# Patient Record
Sex: FEMALE | Race: WHITE | Employment: FULL TIME | URBAN - METROPOLITAN AREA
[De-identification: names, ages, dates, MRNs, and addresses within clinical notes are randomized per-mention and may not be internally consistent; named-entity substitution may affect disease eponyms.]

---

## 2016-11-09 LAB
HBSAG, EXTERNAL: NEGATIVE
HIV, EXTERNAL: NON REACTIVE
RUBELLA, EXTERNAL: NORMAL

## 2017-03-21 LAB
ANTIBODY SCREEN, EXTERNAL: NEGATIVE
T. PALLIDUM, EXTERNAL: NEGATIVE

## 2017-05-17 LAB — GRBS, EXTERNAL: NEGATIVE

## 2017-06-14 ENCOUNTER — ANESTHESIA (OUTPATIENT)
Dept: LABOR AND DELIVERY | Age: 27
End: 2017-06-14
Payer: COMMERCIAL

## 2017-06-14 ENCOUNTER — ANESTHESIA EVENT (OUTPATIENT)
Dept: LABOR AND DELIVERY | Age: 27
End: 2017-06-14
Payer: COMMERCIAL

## 2017-06-14 ENCOUNTER — HOSPITAL ENCOUNTER (INPATIENT)
Age: 27
LOS: 2 days | Discharge: HOME | End: 2017-06-16
Attending: OBSTETRICS & GYNECOLOGY | Admitting: OBSTETRICS & GYNECOLOGY
Payer: COMMERCIAL

## 2017-06-14 PROBLEM — Z37.9 NORMAL LABOR: Status: ACTIVE | Noted: 2017-06-14

## 2017-06-14 LAB
BASOPHILS # BLD AUTO: 0 K/UL (ref 0–0.1)
BASOPHILS # BLD: 0 % (ref 0–1)
EOSINOPHIL # BLD: 0.1 K/UL (ref 0–0.4)
EOSINOPHIL NFR BLD: 1 % (ref 0–7)
ERYTHROCYTE [DISTWIDTH] IN BLOOD BY AUTOMATED COUNT: 13.7 % (ref 11.5–14.5)
HCT VFR BLD AUTO: 42.3 % (ref 35–47)
HGB BLD-MCNC: 14.2 G/DL (ref 11.5–16)
LYMPHOCYTES # BLD AUTO: 13 % (ref 12–49)
LYMPHOCYTES # BLD: 2 K/UL (ref 0.8–3.5)
MCH RBC QN AUTO: 31.7 PG (ref 26–34)
MCHC RBC AUTO-ENTMCNC: 33.6 G/DL (ref 30–36.5)
MCV RBC AUTO: 94.4 FL (ref 80–99)
MONOCYTES # BLD: 0.8 K/UL (ref 0–1)
MONOCYTES NFR BLD AUTO: 5 % (ref 5–13)
NEUTS SEG # BLD: 12 K/UL (ref 1.8–8)
NEUTS SEG NFR BLD AUTO: 81 % (ref 32–75)
PLATELET # BLD AUTO: 128 K/UL (ref 150–400)
RBC # BLD AUTO: 4.48 M/UL (ref 3.8–5.2)
WBC # BLD AUTO: 14.9 K/UL (ref 3.6–11)

## 2017-06-14 PROCEDURE — 74011250636 HC RX REV CODE- 250/636: Performed by: OBSTETRICS & GYNECOLOGY

## 2017-06-14 PROCEDURE — 36415 COLL VENOUS BLD VENIPUNCTURE: CPT | Performed by: OBSTETRICS & GYNECOLOGY

## 2017-06-14 PROCEDURE — 0HQ9XZZ REPAIR PERINEUM SKIN, EXTERNAL APPROACH: ICD-10-PCS | Performed by: OBSTETRICS & GYNECOLOGY

## 2017-06-14 PROCEDURE — 77030031139 HC SUT VCRL2 J&J -A

## 2017-06-14 PROCEDURE — 00HU33Z INSERTION OF INFUSION DEVICE INTO SPINAL CANAL, PERCUTANEOUS APPROACH: ICD-10-PCS | Performed by: ANESTHESIOLOGY

## 2017-06-14 PROCEDURE — 65410000002 HC RM PRIVATE OB

## 2017-06-14 PROCEDURE — 3E0R3CZ INTRODUCE REGIONAL ANESTH IN SPINAL CANAL, PERC: ICD-10-PCS | Performed by: ANESTHESIOLOGY

## 2017-06-14 PROCEDURE — 75410000002 HC LABOR FEE PER 1 HR

## 2017-06-14 PROCEDURE — 75410000000 HC DELIVERY VAGINAL/SINGLE

## 2017-06-14 PROCEDURE — 76060000078 HC EPIDURAL ANESTHESIA

## 2017-06-14 PROCEDURE — 74011250636 HC RX REV CODE- 250/636: Performed by: ANESTHESIOLOGY

## 2017-06-14 PROCEDURE — 74011000250 HC RX REV CODE- 250

## 2017-06-14 PROCEDURE — 10907ZC DRAINAGE OF AMNIOTIC FLUID, THERAPEUTIC FROM PRODUCTS OF CONCEPTION, VIA NATURAL OR ARTIFICIAL OPENING: ICD-10-PCS | Performed by: OBSTETRICS & GYNECOLOGY

## 2017-06-14 PROCEDURE — 74011250637 HC RX REV CODE- 250/637: Performed by: OBSTETRICS & GYNECOLOGY

## 2017-06-14 PROCEDURE — 75410000003 HC RECOV DEL/VAG/CSECN EA 0.5 HR

## 2017-06-14 PROCEDURE — 4A0HXCZ MEASUREMENT OF PRODUCTS OF CONCEPTION, CARDIAC RATE, EXTERNAL APPROACH: ICD-10-PCS | Performed by: OBSTETRICS & GYNECOLOGY

## 2017-06-14 PROCEDURE — 85025 COMPLETE CBC W/AUTO DIFF WBC: CPT | Performed by: OBSTETRICS & GYNECOLOGY

## 2017-06-14 RX ORDER — SODIUM CHLORIDE 0.9 % (FLUSH) 0.9 %
5-10 SYRINGE (ML) INJECTION EVERY 8 HOURS
Status: DISCONTINUED | OUTPATIENT
Start: 2017-06-14 | End: 2017-06-16 | Stop reason: HOSPADM

## 2017-06-14 RX ORDER — BUPIVACAINE HYDROCHLORIDE 5 MG/ML
INJECTION, SOLUTION EPIDURAL; INTRACAUDAL AS NEEDED
Status: DISCONTINUED | OUTPATIENT
Start: 2017-06-14 | End: 2017-06-14 | Stop reason: HOSPADM

## 2017-06-14 RX ORDER — DOCUSATE SODIUM 100 MG/1
100 CAPSULE, LIQUID FILLED ORAL
Status: DISCONTINUED | OUTPATIENT
Start: 2017-06-14 | End: 2017-06-16 | Stop reason: HOSPADM

## 2017-06-14 RX ORDER — BUPIVACAINE HYDROCHLORIDE 5 MG/ML
30 INJECTION, SOLUTION EPIDURAL; INTRACAUDAL AS NEEDED
Status: DISCONTINUED | OUTPATIENT
Start: 2017-06-14 | End: 2017-06-14 | Stop reason: HOSPADM

## 2017-06-14 RX ORDER — IBUPROFEN 400 MG/1
800 TABLET ORAL EVERY 8 HOURS
Status: DISCONTINUED | OUTPATIENT
Start: 2017-06-14 | End: 2017-06-16 | Stop reason: HOSPADM

## 2017-06-14 RX ORDER — OXYTOCIN/RINGER'S LACTATE 20/1000 ML
125-1000 PLASTIC BAG, INJECTION (ML) INTRAVENOUS AS NEEDED
Status: DISCONTINUED | OUTPATIENT
Start: 2017-06-14 | End: 2017-06-16 | Stop reason: HOSPADM

## 2017-06-14 RX ORDER — TERBUTALINE SULFATE 1 MG/ML
0.25 INJECTION SUBCUTANEOUS AS NEEDED
Status: DISCONTINUED | OUTPATIENT
Start: 2017-06-14 | End: 2017-06-14 | Stop reason: HOSPADM

## 2017-06-14 RX ORDER — SODIUM CHLORIDE, SODIUM LACTATE, POTASSIUM CHLORIDE, CALCIUM CHLORIDE 600; 310; 30; 20 MG/100ML; MG/100ML; MG/100ML; MG/100ML
125 INJECTION, SOLUTION INTRAVENOUS CONTINUOUS
Status: DISCONTINUED | OUTPATIENT
Start: 2017-06-14 | End: 2017-06-16 | Stop reason: HOSPADM

## 2017-06-14 RX ORDER — OXYTOCIN IN 5 % DEXTROSE 30/500 ML
PLASTIC BAG, INJECTION (ML) INTRAVENOUS
Status: DISPENSED
Start: 2017-06-14 | End: 2017-06-15

## 2017-06-14 RX ORDER — SIMETHICONE 80 MG
80 TABLET,CHEWABLE ORAL
Status: DISCONTINUED | OUTPATIENT
Start: 2017-06-14 | End: 2017-06-16 | Stop reason: HOSPADM

## 2017-06-14 RX ORDER — DIPHENHYDRAMINE HYDROCHLORIDE 50 MG/ML
12.5 INJECTION, SOLUTION INTRAMUSCULAR; INTRAVENOUS
Status: DISCONTINUED | OUTPATIENT
Start: 2017-06-14 | End: 2017-06-16 | Stop reason: HOSPADM

## 2017-06-14 RX ORDER — SODIUM CHLORIDE 0.9 % (FLUSH) 0.9 %
5-10 SYRINGE (ML) INJECTION AS NEEDED
Status: DISCONTINUED | OUTPATIENT
Start: 2017-06-14 | End: 2017-06-16 | Stop reason: HOSPADM

## 2017-06-14 RX ORDER — AMMONIA 15 % (W/V)
1 AMPUL (EA) INHALATION AS NEEDED
Status: DISCONTINUED | OUTPATIENT
Start: 2017-06-14 | End: 2017-06-16 | Stop reason: HOSPADM

## 2017-06-14 RX ORDER — HYDROCORTISONE ACETATE PRAMOXINE HCL 2.5; 1 G/100G; G/100G
CREAM TOPICAL AS NEEDED
Status: DISCONTINUED | OUTPATIENT
Start: 2017-06-14 | End: 2017-06-16 | Stop reason: HOSPADM

## 2017-06-14 RX ORDER — FENTANYL CITRATE 50 UG/ML
100 INJECTION, SOLUTION INTRAMUSCULAR; INTRAVENOUS
Status: DISCONTINUED | OUTPATIENT
Start: 2017-06-14 | End: 2017-06-14 | Stop reason: HOSPADM

## 2017-06-14 RX ORDER — FENTANYL/BUPIVACAINE/NS/PF 2-1250MCG
1-16 PREFILLED PUMP RESERVOIR EPIDURAL CONTINUOUS
Status: DISCONTINUED | OUTPATIENT
Start: 2017-06-14 | End: 2017-06-16 | Stop reason: HOSPADM

## 2017-06-14 RX ORDER — ONDANSETRON 2 MG/ML
4 INJECTION INTRAMUSCULAR; INTRAVENOUS
Status: DISCONTINUED | OUTPATIENT
Start: 2017-06-14 | End: 2017-06-14 | Stop reason: HOSPADM

## 2017-06-14 RX ORDER — NALBUPHINE HYDROCHLORIDE 10 MG/ML
10 INJECTION, SOLUTION INTRAMUSCULAR; INTRAVENOUS; SUBCUTANEOUS
Status: DISCONTINUED | OUTPATIENT
Start: 2017-06-14 | End: 2017-06-14 | Stop reason: HOSPADM

## 2017-06-14 RX ORDER — FENTANYL CITRATE 50 UG/ML
INJECTION, SOLUTION INTRAMUSCULAR; INTRAVENOUS AS NEEDED
Status: DISCONTINUED | OUTPATIENT
Start: 2017-06-14 | End: 2017-06-14 | Stop reason: HOSPADM

## 2017-06-14 RX ORDER — ONDANSETRON 2 MG/ML
4 INJECTION INTRAMUSCULAR; INTRAVENOUS
Status: DISCONTINUED | OUTPATIENT
Start: 2017-06-14 | End: 2017-06-16 | Stop reason: HOSPADM

## 2017-06-14 RX ORDER — NALOXONE HYDROCHLORIDE 0.4 MG/ML
0.4 INJECTION, SOLUTION INTRAMUSCULAR; INTRAVENOUS; SUBCUTANEOUS AS NEEDED
Status: DISCONTINUED | OUTPATIENT
Start: 2017-06-14 | End: 2017-06-14 | Stop reason: HOSPADM

## 2017-06-14 RX ORDER — FENTANYL CITRATE 50 UG/ML
100 INJECTION, SOLUTION INTRAMUSCULAR; INTRAVENOUS ONCE
Status: DISCONTINUED | OUTPATIENT
Start: 2017-06-14 | End: 2017-06-14 | Stop reason: HOSPADM

## 2017-06-14 RX ORDER — OXYCODONE AND ACETAMINOPHEN 5; 325 MG/1; MG/1
1 TABLET ORAL
Status: DISCONTINUED | OUTPATIENT
Start: 2017-06-14 | End: 2017-06-16 | Stop reason: HOSPADM

## 2017-06-14 RX ORDER — HYDROCORTISONE 1 %
CREAM (GRAM) TOPICAL AS NEEDED
Status: DISCONTINUED | OUTPATIENT
Start: 2017-06-14 | End: 2017-06-16 | Stop reason: HOSPADM

## 2017-06-14 RX ADMIN — BUPIVACAINE HYDROCHLORIDE 6 ML: 5 INJECTION, SOLUTION EPIDURAL; INTRACAUDAL at 11:00

## 2017-06-14 RX ADMIN — IBUPROFEN 800 MG: 400 TABLET, FILM COATED ORAL at 17:50

## 2017-06-14 RX ADMIN — FENTANYL CITRATE 100 MCG: 50 INJECTION, SOLUTION INTRAMUSCULAR; INTRAVENOUS at 11:00

## 2017-06-14 RX ADMIN — SODIUM CHLORIDE, SODIUM LACTATE, POTASSIUM CHLORIDE, AND CALCIUM CHLORIDE 125 ML/HR: 600; 310; 30; 20 INJECTION, SOLUTION INTRAVENOUS at 11:09

## 2017-06-14 RX ADMIN — ONDANSETRON 4 MG: 2 INJECTION INTRAMUSCULAR; INTRAVENOUS at 15:22

## 2017-06-14 RX ADMIN — SODIUM CHLORIDE, SODIUM LACTATE, POTASSIUM CHLORIDE, AND CALCIUM CHLORIDE 1000 ML: 600; 310; 30; 20 INJECTION, SOLUTION INTRAVENOUS at 09:47

## 2017-06-14 RX ADMIN — FENTANYL 0.2 MG/100ML-BUPIV 0.125%-NACL 0.9% EPIDURAL INJ 10 ML/HR: 2/0.125 SOLUTION at 10:49

## 2017-06-14 NOTE — ROUTINE PROCESS
TRANSFER - IN REPORT:    Verbal report received from ISABELA Denton RN on Ale Liao  being received from L&Dfor routine progression of care      Report consisted of patients Situation, Background, Assessment and   Recommendations(SBAR). Information from the following report(s) SBAR was reviewed with the receiving nurse. Opportunity for questions and clarification was provided. Assessment completed upon patients arrival to unit and care assumed.

## 2017-06-14 NOTE — ANESTHESIA PROCEDURE NOTES
Epidural Block    Start time: 6/14/2017 10:50 AM  End time: 6/14/2017 11:03 AM  Performed by: Marylou Downing  Authorized by: Mell IZAGUIRRE     Pre-Procedure  Indication: labor epidural    Preanesthetic Checklist: risks and benefits discussed and timeout performed    Timeout Time: 10:50        Epidural:   Patient position:  Left lateral decubitus  Prep region:  Lumbar  Prep: Chlorhexidine    Location:  L2-3    Needle and Epidural Catheter:   Needle Type:  Tuohy  Needle Gauge:  17 G  Injection Technique:  Loss of resistance using air  Attempts:  1  Catheter Size:  19 G  Events: no blood with aspiration, no cerebrospinal fluid with aspiration, no paresthesia and negative aspiration test    Test Dose:  Bupivacaine 0.25% and negative    Assessment:   Catheter Secured:  Tegaderm and tape  Insertion:  Uncomplicated  Patient tolerance:  Patient tolerated the procedure well with no immediate complications

## 2017-06-14 NOTE — ANESTHESIA POSTPROCEDURE EVALUATION
Post-Anesthesia Evaluation and Assessment    Patient: Christine Layton MRN: [id number]  SSN: [id number]    YOB: 1990  Age: 33 y.o. Sex: female       Cardiovascular Function/Vital Signs  Visit Vitals    /55    Pulse (!) 104    Temp 36.6 °C (97.8 °F)    Resp 14    Ht 5' 6\" (1.676 m)    Breastfeeding No       Patient is status post epidural anesthesia for * No procedures listed *. Nausea/Vomiting: None    Postoperative hydration reviewed and adequate. Pain:  Pain Scale 1: Labor Algorithm/Pain Intensity (06/14/17 1300)  Pain Intensity 1: 0 (06/14/17 1000)   Managed    Neurological Status: At baseline    Mental Status and Level of Consciousness: Arousable    Pulmonary Status:       Adequate oxygenation and airway patent    Complications related to anesthesia: None    Post-anesthesia assessment completed.  No concerns    Signed By: Fide Grover MD     June 14, 2017

## 2017-06-14 NOTE — IP AVS SNAPSHOT
[facility] [facility] 
[phone] Patient: Jaky Parker MRN: [id number] MDK:[birth year]74 You are allergic to the following Allergen Reactions Sulfa (Sulfonamide Antibiotics) Hives Recent Documentation Height Breastfeeding? OB Status Smoking Status 1.676 m Unknown Recent pregnancy Never Smoker Unresulted Labs Order Current Status SAMPLE TO BLOOD BANK In process Emergency Contacts Name Discharge Info Relation Home Work Mobile Juan Manuel Kruse  Other Relative [6] [phone] About your hospitalization You were admitted on:  June 14, 2017 You last received care in the:  [address] [facility] You were discharged on:  June 16, 2017 Unit phone number:  [phone] Why you were hospitalized Your primary diagnosis was:  Not on File Your diagnoses also included:  Normal Labor Providers Seen During Your Hospitalizations Provider Role Specialty Primary office phone Chano Choi MD Attending Provider Obstetrics & Gynecology [phone] Your Primary Care Physician (PCP) Primary Care Physician Office Phone Office Fax [facility] [phone] Follow-up Information Follow up With Details Comments Contact Info [facility] PL[facility] Call As needed for breastfeeding questions or concerns. [address], [address] 
[phone] Chano Choi MD Schedule an appointment as soon as possible for a visit in 6 weeks  [address] [facility] [address] [facility] 13 
[phone] Current Discharge Medication List  
  
CONTINUE these medications which have NOT CHANGED Dose & Instructions Dispensing Information Comments Morning Noon Evening Bedtime PRENATAL PLUS (CALCIUM CARB) 27 mg iron- 1 mg Tab Generic drug:  prenatal vit-calcium-iron-fa Your last dose was: Your next dose is:    
   
   
 Dose:  1 Tab Take 1 Tab by mouth daily. Refills:  0 STOP taking these medications   
 ondansetron 4 mg disintegrating tablet Commonly known as:  ZOFRAN ODT Discharge Instructions POSTPARTUM DISCHARGE INSTRUCTIONS Name:  Juanita Gregg YOB: 1990 Admission Diagnosis:  MATERNITY Normal labor Discharge Diagnosis:   
Problem List as of 6/16/2017  Never Reviewed Codes Class Noted - Resolved Normal labor ICD-10-CM: O80, Z37.9 ICD-9-CM: 637  6/14/2017 - Present Vaginal hematoma ICD-10-CM: N89.8 ICD-9-CM: 623.6  11/13/2014 - Present RESOLVED: PROM (premature rupture of membranes) ICD-10-CM: O42.90 ICD-9-CM: 658.10  11/10/2014 - 11/13/2014 RESOLVED: Active labor ICD-10-CM: Varun Hoot ICD-9-CM: Varun Hoot  11/9/2014 - 11/13/2014 Attending Physician:  Malick Waldron MD 
 
Delivery Type:  Vaginal Childbirth: What To Expect At Home Your Recovery: Your body will slowly heal in the next few weeks. It is easy to get too tired and overwhelmed during the first weeks after your baby is born. Changes in your hormones can shift your mood without warning. You may find it hard to meet the extra demands on your energy and time. Take it easy on yourself. Follow-up care is a key part of your treatment and safety. Be sure to make and go to all appointments, and call your doctor if you are having problems. It's also a good idea to know your test results and keep a list of the medicines you take. How can you care for yourself at home? Vaginal bleeding and cramps · After delivery, you will have a bloody discharge from the vagina. This will turn pink within a week and then white or yellow after about 10 days. It may last for 2 to 4 weeks or longer, until the uterus has healed. Use pads instead of tampons until you stop bleeding. · Do not worry if you pass some blood clots, as long as they are smaller than a golf ball. If you have a tear or stitches in your vaginal area, change the pad at least every 4 hours to prevent soreness and infection. · You may have cramps for the first few days after childbirth. These are normal and occur as the uterus shrinks to normal size. Take an over-the-counter pain medicine, such as acetaminophen (Tylenol), ibuprofen (Advil, Motrin), or naproxen (Aleve), for cramps. Read and follow all instructions on the label. Do not take aspirin, because it can cause more bleeding. Do not take acetaminophen (Tylenol) and other acetaminophen containing medications (i.e. Percocet) at the same time. Breast fullness · Your breasts may overfill (engorge) in the first few days after delivery. To help milk flow and to relieve pain, warm your breasts in the shower or by using warm, moist towels before nursing. · If you are not nursing, do not put warmth on your breasts or touch your breasts. Wear a tight bra or sports bra and use ice until the fullness goes away. This usually takes 2 to 3 days. · Put ice or a cold pack on your breast after nursing to reduce swelling and pain. Put a thin cloth between the ice and your skin. Activity · Eat a balanced diet. Do not try to lose weight by cutting calories. Keep taking your prenatal vitamins, or take a multivitamin. · Get as much rest as you can. Try to take naps when your baby sleeps during the day. · Get some exercise every day. But do not do any heavy exercise until your doctor says it is okay. · Wait until you are healed (about 4 to 6 weeks) before you have sexual intercourse. Your doctor will tell you when it is okay to have sex. · Talk to your doctor about birth control. You can get pregnant even before your period returns. Also, you can get pregnant while you are breast-feeding. Mental Health · Many women get the \"baby blues\" during the first few days after childbirth. You may lose sleep, feel irritable, and cry easily. You may feel happy one minute and sad the next. Hormone changes are one cause of these emotional changes. Also, the demands of a new baby, along with visits from relatives or other family needs, add to a mother's stress. The \"baby blues\" often peak around the fourth day. Then they ease up in less than 2 weeks. · If your moodiness or anxiety lasts for more than 2 weeks, or if you feel like life is not worth living, you may have postpartum depression. This is different for each mother. Some mothers with serious depression may worry intensely about their infant's well-being. Others may feel distant from their child. Some mothers might even feel that they might harm their baby. A mother may have signs of paranoia, wondering if someone is watching her. · With all the changes in your life, you may not know if you are depressed. Pregnancy sometimes causes changes in how you feel that are similar to the symptoms of depression. · Symptoms of depression include: · Feeling sad or hopeless and losing interest in daily activities. These are the most common symptoms of depression. · Sleeping too much or not enough. · Feeling tired. You may feel as if you have no energy. · Eating too much or too little. · POSTPARTUM SUPPORT INTERNATIONAL (PSI) offers a Warm line; Chat with the Expert phone sessions; Information and Articles about Pregnancy and Postpartum Mood Disorders; Comprehensive List of Free Support Groups; Knowledgeable local coordinators who will offer support, information, and resources; Guide to Resources on Cup[employer]-L[employer]; Calendar of events in the  mood disorders community; Latest News and Research; and [facility] [address] for [employer]. Remember - You are not alone; You are not to blame; With help, you will be well. 5-190-803-PPD(3610). WWW. POSTPARTUM. NET · Writing or talking about death, such as writing suicide notes or talking about guns, knives, or pills. Keep the numbers for these national suicide hotlines: 1-868-628-TALK (0-431.912.8723) and 4-543-QTDPASW (8-600.260.5690). If you or someone you know talks about suicide or feeling hopeless, get help right away. Constipation and Hemorrhoids · Drink plenty of fluids, enough so that your urine is light yellow or clear like water. If you have kidney, heart, or liver disease and have to limit fluids, talk with your doctor before you increase the amount of fluids you drink. · Eat plenty of fiber each day. Have a bran muffin or bran cereal for breakfast, and try eating a piece of fruit for a mid-afternoon snack. · For painful, itchy hemorrhoids, put ice or a cold pack on the area several times a day for 10 minutes at a time. Follow this by putting a warm compress on the area for another 10 to 20 minutes or by sitting in a shallow, warm bath. When should you call for help? Call 911 anytime you think you may need emergency care. For example, call if: 
· You are thinking of hurting yourself, your baby, or anyone else. · You passed out (lost consciousness). · You have symptoms of a blood clot in your lung (called a pulmonary embolism). These may include:   
· Sudden chest pain. · Trouble breathing. · Coughing up blood. Call your doctor now or seek immediate medical care if: 
· You have severe vaginal bleeding. · You are soaking through a pad each hour for 2 or more hours. · Your vaginal bleeding seems to be getting heavier or is still bright red 4 days after delivery. · You are dizzy or lightheaded, or you feel like you may faint. · You are vomiting or cannot keep fluids down. · You have a fever. · You have new or more belly pain. · You pass tissue (not just blood). · Your vaginal discharge smells bad. · Your belly feels tender or full and hard. · Your breasts are continuously painful or red. · You feel sad, anxious, or hopeless for more than a few days. · You have sudden, severe pain in your belly. · You have symptoms of a blood clot in your leg (called a deep vein thrombosis),  
       such as: 
· Pain in your calf, back of the knee, thigh, or groin. · Redness and swelling in your leg or groin. · You have symptoms of preeclampsia, such as: 
· Sudden swelling of your face, hands, or feet. · New vision problems (such as dimness or blurring). · A severe headache. · Your blood pressure is higher than it should be or rises suddenly. · You have new nausea or vomiting. Watch closely for changes in your health, and be sure to contact your doctor if you have any problems. Additional Information:  Not applicable These are general instructions for a healthy lifestyle: No smoking/ No tobacco products/ Avoid exposure to second hand smoke Surgeon General's Warning:  Quitting smoking now greatly reduces serious risk to your health. Obesity, smoking, and sedentary lifestyle greatly increases your risk for illness A healthy diet, regular physical exercise & weight monitoring are important for maintaining a healthy lifestyle Recognize signs and symptoms of STROKE: 
 
F-face looks uneven A-arms unable to move or move unevenly S-speech slurred or non-existent T-time-call 911 as soon as signs and symptoms begin - DO NOT go  
    back to bed or wait to see if you get better - TIME IS BRAIN. I have had the opportunity to make my options or choices for discharge. I have received and understand these instructions. Discharge Orders None Introducing [facility] SERVICES! Ramesh Davey introduces [email or web address] patient portal. Now you can access parts of your medical record, email your doctor's office, and request medication refills online. 1. In your internet browser, go to [email or web address] 2. Click on the First Time User? Click Here link in the Sign In box. You will see the New Member Sign Up page. 3. Enter your [employer] Access Code exactly as it appears below. You will not need to use this code after youve completed the sign-up process. If you do not sign up before the expiration date, you must request a new code. · [employer] Access Code: [email or web address] Expires: 9/14/2017  9:48 AM 
 
4. Enter the last four digits of your Social Security Number (xxxx) and Date of Birth (mm/dd/yyyy) as indicated and click Submit. You will be taken to the next sign-up page. 5. Create a [employer] ID. This will be your [employer] login ID and cannot be changed, so think of one that is secure and easy to remember. 6. Create a [employer] password. You can change your password at any time. 7. Enter your Password Reset Question and Answer. This can be used at a later time if you forget your password. 8. Enter your e-mail address. You will receive e-mail notification when new information is available in [address] 9. Click Sign Up. You can now view and download portions of your medical record. 10. Click the Download Summary menu link to download a portable copy of your medical information. If you have questions, please visit the Frequently Asked Questions section of the [employer] website. Remember, [employer] is NOT to be used for urgent needs. For medical emergencies, dial 911. Now available from your iPhone and Android! General Information Please provide this summary of care documentation to your next provider. Patient Signature:  ____________________________________________________________ Date:  ____________________________________________________________  
  
Robb Roman Provider Signature:  ____________________________________________________________ Date:  ____________________________________________________________

## 2017-06-14 NOTE — PROGRESS NOTES
1145- Bedside and Verbal shift change report given to néstor paulson RN  (oncoming nurse) by nancy Galarza RN (offgoing nurse). Report included the following information SBAR, Procedure Summary, Intake/Output and MAR.     1730- infant latched on well and began nursing x1 side. 1745-TRANSFER - OUT REPORT:    Verbal report given to ALBA Heard RN (name) on [facility]  being transferred to [facility] (unit) for routine progression of care       Report consisted of patients Situation, Background, Assessment and   Recommendations(SBAR). Information from the following report(s) SBAR, Intake/Output, MAR, Accordion, Recent Results and Med Rec Status was reviewed with the receiving nurse. Lines:   Peripheral IV 03/15/16 Left Antecubital (Active)       Peripheral IV 06/14/17 Left Wrist (Active)   Site Assessment Clean, dry, & intact 6/14/2017  8:43 AM   Phlebitis Assessment 0 6/14/2017  8:43 AM   Infiltration Assessment 0 6/14/2017  8:43 AM   Dressing Status Clean, dry, & intact 6/14/2017  8:43 AM   Dressing Type Tape;Transparent 6/14/2017  8:43 AM   Hub Color/Line Status Pink 6/14/2017  8:43 AM   Action Taken Blood drawn 6/14/2017  8:43 AM        Opportunity for questions and clarification was provided.       Patient transported with:   Registered Nurse

## 2017-06-14 NOTE — IP AVS SNAPSHOT
Current Discharge Medication List  
  
CONTINUE these medications which have NOT CHANGED Dose & Instructions Dispensing Information Comments Morning Noon Evening Bedtime PRENATAL PLUS (CALCIUM CARB) 27 mg iron- 1 mg Tab Generic drug:  prenatal vit-calcium-iron-fa Your last dose was: Your next dose is:    
   
   
 Dose:  1 Tab Take 1 Tab by mouth daily. Refills:  0 STOP taking these medications   
 ondansetron 4 mg disintegrating tablet Commonly known as:  ZOFRAN ODT

## 2017-06-14 NOTE — PROGRESS NOTES
Labor Progress Note  Patient seen, fetal heart rate and contraction pattern evaluated. Pt comfortable with epidural    VSS, pitocin @ NA  Fetal Heart Rate: moderate variability  Accelerations: yes  Decelerations: none  Uterine Activity: not registering well  Cervical Exam: 8/90/-2  Membranes:  AROM - clear    Assessment/Plan:  Reassuring fetal status. Will continue current management.   AROM - clear

## 2017-06-14 NOTE — L&D DELIVERY NOTE
Delivery Summary  Patient: Sterling Desai             Circumcision:   NA-female  Additional Delivery Comments - head OA, rest of body followed quickly and without difficulty. 1st degree vaginal/perineal lac repaired for reapproximation of tissue (perineal skin pulled away from vaginal skin). Information for the patient's :  Maris Caraballo [[id number]]       Labor Events:    Labor: No   Rupture Date: 2017   Rupture Time: 11:41 AM   Rupture Type AROM   Amniotic Fluid Volume: Moderate    Amniotic Fluid Description: Clear     Induction: None       Augmentation: AROM   Labor Events: None     Cervical Ripening:     None     Delivery Events:  Episiotomy: None   Laceration(s): First degree perineal     Repaired:      Number of Repair Packets:     Suture Type and Size: Chromic 3-0     Estimated Blood Loss (ml):  ml       Delivery Date: 2017    Delivery Time: 2:40 PM  Delivery Type: Vaginal, Spontaneous Delivery  Sex:  Female     Gestational Age: 36w2d   Delivery Clinician:  Matthew Smith  Living Status: Yes   Delivery Location: L&D            APGARS  One minute Five minutes Ten minutes   Skin color: 1   1        Heart rate: 2   2        Grimace: 2   2        Muscle tone: 2   2        Breathin   2        Totals: 9   9            Presentation: Vertex    Position:        Resuscitation Method:  Suctioning-bulb; Tactile Stimulation     Meconium Stained: None      Cord Vessels: 3 Vessels      Cord Events:    Cord Blood Sent?:  Yes    Blood Gases Sent?:  No    Placenta:  Date/Time:   2:49 PM  Removal: Spontaneous      Appearance: Normal      Measurements:  Birth Weight:        Birth Length:        Head Circumference:        Chest Circumference:       Abdominal Girth:       Other Providers:   SYL CLYATON, Obstetrician;Primary Nurse;Primary [place] Nurse           Cord pH:  none    Episiotomy: None   Laceration(s): First degree perineal Estimated Blood Loss (ml): 300    Labor Events  Method: None       Augmentation: AROM   Cervical Ripening:       None        Operative Vaginal Delivery - none    Group B Strep:   Lab Results   Component Value Date/Time    GrBStrejohn, External negative 2017     Information for the patient's :  Martin Saenz [[id number]]     Lab Results   Component Value Date/Time    ABO/Rh(D) O POSITIVE 2017 02:48 PM    LAURA IgG NEG 2017 02:48 PM    Bilirubin if LAURA pos: IF DIRECT CHERRI POSITIVE, BILIRUBIN TO FOLLOW 2017 02:48 PM     No results found for: APH, APCO2, APO2, AHCO3, ABEC, ABDC, O2ST, EPHV, PCO2V, PO2V, HCO3V, EBEV, EBDV, SITE, RSCOM

## 2017-06-14 NOTE — H&P
EDC:06/10/2017  EGA: 40 weeks, 4 days      Primary Provider:  Dexter Burkitt MD      History of Present Illness:  pt presents to L&D with RUC that are more painful since 4:30 am    cvx in office was 3-4, now 4cm per RN. will keep for labor. Patient's Prenatal Care with Doctor of Record Dexter Burkitt MD Notable For -    Normal pregnancy multigravida   lab screening  vaginal hematoma postpartum after G1, 10cm, blood transfusion-labs__, MFM consult__          Impression & Recommendations:    Problem # 1:  Normal pregnancy multigravida (ICD-V22.1) (RLU16-U54.83)  term labor  GBS negative  expectant managment  epidural when desired      Past Pregnancy History      :  2     Term Births:  1     Premature Births: 0     Living Children: 1     Para:   1     Mult. Births:  0     Prev : 0     Aborta:  0     Elect. Ab:  0     Spont. Ab:  0     Ectopics:  0    Pregnancy # 1     Delivery date:   11/10/2014     Weeks Gestation: 39w 4d     Delivery type:        Hours of labor:   10.5     Anesthesia type:   epidural     Delivery location:   [facility]     Infant Sex:  Female     Birth weight:  8lbs 7ozs     Name:  Edmund Side     Comments:  [facility] - Apgars 9/9, thin meconium, nuchal cord, 2nd degree laceration. complicated by large vaginal hematoma        Past Medical History:     Reviewed history from 2014 and no changes required:        IC         Headaches (Migraines)    Past Surgical History:     Reviewed history from 2014 and no changes required:         Tonsillectomy        [place] [facility]        Vaginal Delivery 11/10/2014 Female [facility])- complicated by 44IT vaginal hematoma, 2U pRBC, hematoma expectantly managed)    Family History Summary:      Reviewed history Last on 2017 and no changes required:2017      General Comments - FH:  Family history transferred to [address] [place] [address]      Social History:     Reviewed history from 2016 and no changes required:         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 Review of Systems        See HPI    Except as noted in the HPI, the review of systems is negative for General, Breast, , Resp, GI, Endo, MS, Psych and Heme. Allergies    SULFA (Moderate)      Medications Removed from Medication List        Flowsheet View for Follow-up Visit     Estimated weeks of        gestation:  40 4/7     Weight:  189.8     Blood pressure: 114 / 80      Vital Signs    Blood Pressure: 114 / 80    Height:   66.5 inches  Weight:  189.8 pounds      Physical Exam     General           General appearance:  no acute distress    Head           Inspection:   normal    Chest           Lungs:  clear to auscultation    Psych           Orientation:  oriented to time, place, and person          Mood:  no appearance of anxiety, depression, or agitation    Abdomen           Abdomen:  gravid            Impression & Recommendations:    Problem # 1:  Normal pregnancy multigravida (ICD-V22.1) (MPD97-Z23.83)  term labor  GBS negative  expectant managment  epidural when desired      Medications (at conclusion of this visit)    11/09/2016 * PNV           LABORATORY DATA   TEST DATE RESULT   Group B Strep culture 05/17/2017 Negative                                   (Group B Strep Culture Result Field)   Blood Type 11/09/2016 O                                             (Blood Type Result Field)   Rh 11/09/2016 Positive                                   (Rh Result Field)   Rhogam Inj Given 11/11/2014 *   Tdap Vaccine Given 03/21/2017 Vacc.  [address]   Antibody Screen 03/21/2017 Negative   Rubella  [facility] Reference Ranges On or After 3/10/14                  <0.90              Non-immune      0.90 - 0.99     Equivocal      >0.99              Immune    [facility] Reference Ranges  Before 3/10/14           <5                 Non-immune 5 - 9               Equivocal            >9                 Immune  [facility] Reference Ranges       < Or = 0.90       Negative             0.91-1.09          Equivocal            > Or = 1.10       Positive   11/09/2016     2.17     TPA (T Pallidum Antibodies) 03/21/2017 Negative   Serology (RPR) 11/11/2014 *   HBsAg 11/09/2016 Negative   HIV 11/09/2016 Non Reactive   Hemoglobin 03/21/2017 13.4   Hematocrit 03/21/2017 39.6   Platelets 37/50/6579 164 X10E3/UL   TSH 11/11/2014 *   Urine Culture 11/09/2016 Negative   GC DNA Probe 11/09/2016 Negative   Chlamydia DNA 11/09/2016 Negative   PAP 03/31/2016 NIL   Flu Vaccine Given 11/09/2016 vacc. elsewhere   HGBA1C 11/11/2014 *   HGB Electro 04/02/2014 N/A   T4, Free 11/11/2014 *   BG Fasting 11/11/2014 *   GTT 1H 50G 03/21/2017 102   GTT 1H 100G 11/11/2014 *   GTT 2H 100G 11/11/2014 *   GTT 3H 100G 11/11/2014 *   Glucose Plasma 11/11/2014 *   CF Accept or Decline 11/09/2016 declined   CF Screen Result 04/02/2014 Declined   Nuchal Trans 01/24/2017 4.08^4. 08 mm&millimeters   AFP Only     Tetra 12/27/2016 *Screen Negative*   AFP Serum 11/11/2014 *   CVS 11/11/2014 *   AFP Amniotic 11/11/2014 *   Amnio Karyo 11/11/2014 *   FISH 11/11/2014 *   GC Culture 11/11/2014 *   Chlamydia Cult 11/11/2014 *   Ureaplasma     Mycoplasma     WBC 01/24/2017 11.2 X10E3/UL   RBC 01/24/2017 4.20 X10E6/UL   MCV 01/24/2017 93   MCH 01/24/2017 30.7   MCHC RBC 01/24/2017 33.0     ULTRASOUND DATA   TEST DATE RESULT   Estimated Fetal Weight 01/24/2017 395^395 g&grams                                     Weight % 01/24/2017 77^77% %&percent                                                JACINTO                      BPP     Cervical Length (mm)             Electronically signed by Marco Parr MD on 06/14/2017 at 9:34 AM    ________________________________________________________________________

## 2017-06-15 PROCEDURE — 74011250637 HC RX REV CODE- 250/637: Performed by: OBSTETRICS & GYNECOLOGY

## 2017-06-15 PROCEDURE — 65410000002 HC RM PRIVATE OB

## 2017-06-15 RX ADMIN — OXYCODONE HYDROCHLORIDE AND ACETAMINOPHEN 1 TABLET: 5; 325 TABLET ORAL at 04:46

## 2017-06-15 RX ADMIN — IBUPROFEN 800 MG: 400 TABLET, FILM COATED ORAL at 02:28

## 2017-06-15 RX ADMIN — IBUPROFEN 800 MG: 400 TABLET, FILM COATED ORAL at 20:32

## 2017-06-15 RX ADMIN — IBUPROFEN 800 MG: 400 TABLET, FILM COATED ORAL at 11:24

## 2017-06-15 NOTE — ROUTINE PROCESS
OB SBAR received from ALBA Ellis, [address] [facility]: patient OOB with assistance to bathroom. Voided large amount clear yellow urine. DTV complete. Check void due by 0155. Instructed to call out for assistance once more. 2145: patient OOB to bathroom. Voided large amount urine. Check void complete.     2000-0000: hourly rounds complete

## 2017-06-15 NOTE — PROGRESS NOTES
Post-Partum Day Number 1 Progress Note    Katie Kruse     Assessment: Doing well, post partum day 1, BF well. Plan:  1. Continue routine postpartum and perineal care as well as maternal education. 2. N/A     Information for the patient's :  Maris Caraballo [[id number]]   Vaginal, Spontaneous Delivery   Patient doing well without significant complaint. Voiding without difficulty, normal lochia. Vitals:  Visit Vitals    /58 (BP 1 Location: Left arm, BP Patient Position: At rest)    Pulse 76    Temp 98.1 °F (36.7 °C)    Resp 16    Ht 5' 6\" (1.676 m)    Breastfeeding Unknown     Temp (24hrs), Av.2 °F (36.8 °C), Min:97.8 °F (36.6 °C), Max:98.7 °F (37.1 °C)        Exam:   Patient without distress. Abdomen soft, fundus firm, nontender                Perineum with normal lochia noted. Lower extremities are negative for swelling, cords or tenderness.     Labs:     Lab Results   Component Value Date/Time    WBC 14.9 2017 09:01 AM    WBC 6.0 2015 01:45 AM    WBC 19.2 2014 07:00 PM    WBC 18.8 2014 07:04 AM    WBC 20.0 2014 02:03 AM    WBC 20.3 11/10/2014 10:12 PM    WBC 23.5 11/10/2014 08:06 PM    WBC 22.0 11/10/2014 06:08 PM    WBC 11.6 11/10/2014 04:15 AM    HGB 14.2 2017 09:01 AM    HGB 14.0 2015 01:45 AM    HGB 9.5 2014 07:00 PM    HGB 7.3 2014 07:04 AM    HGB 8.0 2014 02:03 AM    HGB 8.4 11/10/2014 10:12 PM    HGB 9.3 11/10/2014 08:06 PM    HGB 9.9 11/10/2014 06:08 PM    HGB 13.2 11/10/2014 04:15 AM    HCT 42.3 2017 09:01 AM    HCT 43.0 2015 01:45 AM    HCT 28.5 2014 07:00 PM    HCT 22.0 2014 07:04 AM    HCT 23.5 2014 02:03 AM    HCT 25.3 11/10/2014 10:12 PM    HCT 27.9 11/10/2014 08:06 PM    HCT 29.5 11/10/2014 06:08 PM    HCT 39.4 11/10/2014 04:15 AM    PLATELET 028  09:01 AM    PLATELET 586  01:45 AM    PLATELET 693  07:00 PM PLATELET 043 12/12/2449 07:04 AM    PLATELET 066 43/16/5960 02:03 AM    PLATELET 119 17/66/8243 10:12 PM    PLATELET 214 85/98/9717 08:06 PM    PLATELET 404 09/51/6948 06:08 PM    PLATELET 959 87/87/1614 04:15 AM       Recent Results (from the past 24 hour(s))   CBC WITH AUTOMATED DIFF    Collection Time: 06/14/17  9:01 AM   Result Value Ref Range    WBC 14.9 (H) 3.6 - 11.0 K/uL    RBC 4.48 3.80 - 5.20 M/uL    HGB 14.2 11.5 - 16.0 g/dL    HCT 42.3 35.0 - 47.0 %    MCV 94.4 80.0 - 99.0 FL    MCH 31.7 26.0 - 34.0 PG    MCHC 33.6 30.0 - 36.5 g/dL    RDW 13.7 11.5 - 14.5 %    PLATELET 689 (L) 439 - 400 K/uL    NEUTROPHILS 81 (H) 32 - 75 %    LYMPHOCYTES 13 12 - 49 %    MONOCYTES 5 5 - 13 %    EOSINOPHILS 1 0 - 7 %    BASOPHILS 0 0 - 1 %    ABS. NEUTROPHILS 12.0 (H) 1.8 - 8.0 K/UL    ABS. LYMPHOCYTES 2.0 0.8 - 3.5 K/UL    ABS. MONOCYTES 0.8 0.0 - 1.0 K/UL    ABS. EOSINOPHILS 0.1 0.0 - 0.4 K/UL    ABS.  BASOPHILS 0.0 0.0 - 0.1 K/UL

## 2017-06-15 NOTE — ROUTINE PROCESS
0730: OB SBAR report received by Thea Driscoll RN. 1200: Hourly rounds completed [id number].  1600: Hourly rounds completed [id number].  2000: Hourly rounds completed [id number].

## 2017-06-15 NOTE — LACTATION NOTE
This note was copied from a baby's chart. Initial Lactation Consultation - Baby born vaginally yesterday afternoon to a  mom at 43 weeks and 4 days. Mom states she nursed her first child for 16 months. She used a nipple shield for the first couple weeks with her first child because baby was having difficulty latching. Mom states this baby is latching. Her nipples are sore but she feels the latch is deep. Mom will call out at the next feeding so I can assess her latch. Feeding Plan: Mother will keep baby skin to skin as often as possible, feed on demand,  respond to feeding cues, obtain latch, listen for audible swallowing, be aware of signs of oxytocin release/ cramping,thrist,sleepyness while breastfeeding, offer both breasts,and will not limit feedings.

## 2017-06-16 VITALS
HEIGHT: 66 IN | TEMPERATURE: 98.4 F | DIASTOLIC BLOOD PRESSURE: 78 MMHG | HEART RATE: 84 BPM | RESPIRATION RATE: 16 BRPM | SYSTOLIC BLOOD PRESSURE: 119 MMHG

## 2017-06-16 PROCEDURE — 74011250637 HC RX REV CODE- 250/637: Performed by: OBSTETRICS & GYNECOLOGY

## 2017-06-16 RX ADMIN — IBUPROFEN 800 MG: 400 TABLET, FILM COATED ORAL at 06:06

## 2017-06-16 NOTE — LACTATION NOTE
This note was copied from a baby's chart. Mom and baby scheduled for discharge today. I did not see the baby at the breast. Mom states baby is nursing well and has improved throughout post partum stay, deep latch maintained, mother is comfortable, milk is in transition, baby feeding vigorously with rhythmic suck, swallow, breathe pattern, with audible swallowing, and evident milk transfer, both breasts offered, baby is asleep following feeding. Baby is feeding on demand, voiding and stools present as appropriate over the last 24 hours. We reviewed cluster feeding. The brief behavioral phase preceeding milk transition is called cluster feeding. Typical  behavior: baby becomes vigorous at the breast and wants to feed frequently- every 1-2 hours for several feedings. Emptying of the breast twice produces double in subsequent feedings. This is the normal process by which the baby demands his/her supply. This type of frequent feeding is the stimulation which causes lactogenesis II (milk coming in). Mom states baby did some cluster feeding late last evening. Discussed engorgement. Breasts may become engorged when milk \"comes in\". How milk is made / normal phases of milk production, supply and demand discussed. Taught care of engorged breasts - frequent breastfeeding encouraged, warm compresses and breast massage ac. Then nurse the baby or pump. Apply cold compresses pc x 15 minutes a few times a day for swelling or discomfort. May need to do this care for a couple of days. Mom states she feels her breast are getting russo and her milk is coming in. Teaching completed and all questions answered. Feeding log updated and given to mom.

## 2017-06-16 NOTE — PROGRESS NOTES
Post-Partum Day Number 2 Progress Note    Katie Kruse     Assessment: Doing well, post partum day 2, BF well    Plan:   1. Discharge home today  2. Follow up in office in 6 weeks with Radha Gama MD  3. Post partum activity advised, diet as tolerated  4. Discharge Medications: medications prior to admission, declines pain Rxs    Information for the patient's :  uBcky Martines [[id number]]   Vaginal, Spontaneous Delivery   Patient doing well without significant complaint. Voiding without difficulty, normal lochia. Vitals:  Visit Vitals    /76 (BP 1 Location: Left arm)    Pulse 94    Temp 98.4 °F (36.9 °C)    Resp 14    Ht 5' 6\" (1.676 m)    Breastfeeding Unknown     Temp (24hrs), Av.3 °F (36.8 °C), Min:98.1 °F (36.7 °C), Max:98.6 °F (37 °C)      Exam:         Patient without distress. Abdomen soft, fundus firm, nontender                 Lower extremities are negative for swelling, cords or tenderness.     Labs:     Lab Results   Component Value Date/Time    WBC 14.9 2017 09:01 AM    WBC 6.0 2015 01:45 AM    WBC 19.2 2014 07:00 PM    WBC 18.8 2014 07:04 AM    WBC 20.0 2014 02:03 AM    WBC 20.3 11/10/2014 10:12 PM    WBC 23.5 11/10/2014 08:06 PM    WBC 22.0 11/10/2014 06:08 PM    WBC 11.6 11/10/2014 04:15 AM    HGB 14.2 2017 09:01 AM    HGB 14.0 2015 01:45 AM    HGB 9.5 2014 07:00 PM    HGB 7.3 2014 07:04 AM    HGB 8.0 2014 02:03 AM    HGB 8.4 11/10/2014 10:12 PM    HGB 9.3 11/10/2014 08:06 PM    HGB 9.9 11/10/2014 06:08 PM    HGB 13.2 11/10/2014 04:15 AM    HCT 42.3 2017 09:01 AM    HCT 43.0 2015 01:45 AM    HCT 28.5 2014 07:00 PM    HCT 22.0 2014 07:04 AM    HCT 23.5 2014 02:03 AM    HCT 25.3 11/10/2014 10:12 PM    HCT 27.9 11/10/2014 08:06 PM    HCT 29.5 11/10/2014 06:08 PM    HCT 39.4 11/10/2014 04:15 AM    PLATELET 040  09:01 AM    PLATELET 063  01:45 AM PLATELET 825 38/89/0709 07:00 PM    PLATELET 504 77/35/9413 07:04 AM    PLATELET 890 12/52/5218 02:03 AM    PLATELET 130 92/08/3168 10:12 PM    PLATELET 961 62/28/0390 08:06 PM    PLATELET 856 60/92/3807 06:08 PM    PLATELET 677 25/82/4312 04:15 AM       No results found for this or any previous visit (from the past 24 hour(s)).

## 2017-06-16 NOTE — DISCHARGE INSTRUCTIONS
POSTPARTUM DISCHARGE INSTRUCTIONS       Name:  Rodney Quijano  YOB: 1990  Admission Diagnosis:  MATERNITY  Normal labor     Discharge Diagnosis:    Problem List as of 6/16/2017  Never Reviewed          Codes Class Noted - Resolved    Normal labor ICD-10-CM: O80, Z37.9  ICD-9-CM: 572  6/14/2017 - Present        Vaginal hematoma ICD-10-CM: N89.8  ICD-9-CM: 623.6  11/13/2014 - Present        RESOLVED: PROM (premature rupture of membranes) ICD-10-CM: O42.90  ICD-9-CM: 658.10  11/10/2014 - 11/13/2014        RESOLVED: Active labor ICD-10-CM: TCN4882  ICD-9-CM: Ardesaul Flatter  11/9/2014 - 11/13/2014            Attending Physician:  Joeann Ahumada, MD    Delivery Type:  Vaginal Childbirth: What To Expect At Home    Your Recovery: Your body will slowly heal in the next few weeks. It is easy to get too tired and overwhelmed during the first weeks after your baby is born. Changes in your hormones can shift your mood without warning. You may find it hard to meet the extra demands on your energy and time. Take it easy on yourself. Follow-up care is a key part of your treatment and safety. Be sure to make and go to all appointments, and call your doctor if you are having problems. It's also a good idea to know your test results and keep a list of the medicines you take. How can you care for yourself at home? Vaginal bleeding and cramps  · After delivery, you will have a bloody discharge from the vagina. This will turn pink within a week and then white or yellow after about 10 days. It may last for 2 to 4 weeks or longer, until the uterus has healed. Use pads instead of tampons until you stop bleeding. · Do not worry if you pass some blood clots, as long as they are smaller than a golf ball. If you have a tear or stitches in your vaginal area, change the pad at least every 4 hours to prevent soreness and infection. · You may have cramps for the first few days after childbirth.  These are normal and occur as the uterus shrinks to normal size. Take an over-the-counter pain medicine, such as acetaminophen (Tylenol), ibuprofen (Advil, Motrin), or naproxen (Aleve), for cramps. Read and follow all instructions on the label. Do not take aspirin, because it can cause more bleeding. Do not take acetaminophen (Tylenol) and other acetaminophen containing medications (i.e. Percocet) at the same time. Breast fullness  · Your breasts may overfill (engorge) in the first few days after delivery. To help milk flow and to relieve pain, warm your breasts in the shower or by using warm, moist towels before nursing. · If you are not nursing, do not put warmth on your breasts or touch your breasts. Wear a tight bra or sports bra and use ice until the fullness goes away. This usually takes 2 to 3 days. · Put ice or a cold pack on your breast after nursing to reduce swelling and pain. Put a thin cloth between the ice and your skin. Activity  · Eat a balanced diet. Do not try to lose weight by cutting calories. Keep taking your prenatal vitamins, or take a multivitamin. · Get as much rest as you can. Try to take naps when your baby sleeps during the day. · Get some exercise every day. But do not do any heavy exercise until your doctor says it is okay. · Wait until you are healed (about 4 to 6 weeks) before you have sexual intercourse. Your doctor will tell you when it is okay to have sex. · Talk to your doctor about birth control. You can get pregnant even before your period returns. Also, you can get pregnant while you are breast-feeding. Mental Health  · Many women get the \"baby blues\" during the first few days after childbirth. You may lose sleep, feel irritable, and cry easily. You may feel happy one minute and sad the next. Hormone changes are one cause of these emotional changes. Also, the demands of a new baby, along with visits from relatives or other family needs, add to a mother's stress.  The \"baby blues\" often peak around the fourth day. Then they ease up in less than 2 weeks. · If your moodiness or anxiety lasts for more than 2 weeks, or if you feel like life is not worth living, you may have postpartum depression. This is different for each mother. Some mothers with serious depression may worry intensely about their infant's well-being. Others may feel distant from their child. Some mothers might even feel that they might harm their baby. A mother may have signs of paranoia, wondering if someone is watching her. · With all the changes in your life, you may not know if you are depressed. Pregnancy sometimes causes changes in how you feel that are similar to the symptoms of depression. · Symptoms of depression include:  · Feeling sad or hopeless and losing interest in daily activities. These are the most common symptoms of depression. · Sleeping too much or not enough. · Feeling tired. You may feel as if you have no energy. · Eating too much or too little. · POSTPARTUM [employer] (PSI) offers a Warm line; Chat with the Expert phone sessions; Information and Articles about Pregnancy and Postpartum Mood Disorders; Comprehensive List of Free Support Groups; Knowledgeable local coordinators who will offer support, information, and resources; Guide to Resources on Unype; Calendar of events in the  mood disorders community; Latest News and Research; and [employer] [address] for [employer]. Remember - You are not alone; You are not to blame; With help, you will be well. 0-300-417-PPD(4128). WWW. POSTPARTUM. NET   · Writing or talking about death, such as writing suicide notes or talking about guns, knives, or pills. Keep the numbers for these national suicide hotlines: 3-327-602-TALK (2-291.179.1450) and 7-223-IDKHNCM (1-615.423.7054). If you or someone you know talks about suicide or feeling hopeless, get help right away.     Constipation and Hemorrhoids  · Drink plenty of fluids, enough so that your urine is light yellow or clear like water. If you have kidney, heart, or liver disease and have to limit fluids, talk with your doctor before you increase the amount of fluids you drink. · Eat plenty of fiber each day. Have a bran muffin or bran cereal for breakfast, and try eating a piece of fruit for a mid-afternoon snack. · For painful, itchy hemorrhoids, put ice or a cold pack on the area several times a day for 10 minutes at a time. Follow this by putting a warm compress on the area for another 10 to 20 minutes or by sitting in a shallow, warm bath. When should you call for help? Call 911 anytime you think you may need emergency care. For example, call if:  · You are thinking of hurting yourself, your baby, or anyone else. · You passed out (lost consciousness). · You have symptoms of a blood clot in your lung (called a pulmonary embolism). These may include:    · Sudden chest pain. · Trouble breathing. · Coughing up blood. Call your doctor now or seek immediate medical care if:  · You have severe vaginal bleeding. · You are soaking through a pad each hour for 2 or more hours. · Your vaginal bleeding seems to be getting heavier or is still bright red 4 days after delivery. · You are dizzy or lightheaded, or you feel like you may faint. · You are vomiting or cannot keep fluids down. · You have a fever. · You have new or more belly pain. · You pass tissue (not just blood). · Your vaginal discharge smells bad. · Your belly feels tender or full and hard. · Your breasts are continuously painful or red. · You feel sad, anxious, or hopeless for more than a few days. · You have sudden, severe pain in your belly. · You have symptoms of a blood clot in your leg (called a deep vein thrombosis),          such as:  · Pain in your calf, back of the knee, thigh, or groin. · Redness and swelling in your leg or groin.   · You have symptoms of preeclampsia, such as:  · Sudden swelling of your face, hands, or feet. · New vision problems (such as dimness or blurring). · A severe headache. · Your blood pressure is higher than it should be or rises suddenly. · You have new nausea or vomiting. Watch closely for changes in your health, and be sure to contact your doctor if you have any problems. Additional Information:  Not applicable    These are general instructions for a healthy lifestyle:    No smoking/ No tobacco products/ Avoid exposure to second hand smoke    Surgeon General's Warning:  Quitting smoking now greatly reduces serious risk to your health. Obesity, smoking, and sedentary lifestyle greatly increases your risk for illness    A healthy diet, regular physical exercise & weight monitoring are important for maintaining a healthy lifestyle    Recognize signs and symptoms of STROKE:    F-face looks uneven    A-arms unable to move or move unevenly    S-speech slurred or non-existent    T-time-call 911 as soon as signs and symptoms begin - DO NOT go       back to bed or wait to see if you get better - TIME IS BRAIN. I have had the opportunity to make my options or choices for discharge. I have received and understand these instructions.

## 2017-06-16 NOTE — ROUTINE PROCESS
Verbal/bedside report received from ISABELA Franco RN using Hector Schwab.    [id number] Hourly rounds complete

## 2017-06-16 NOTE — ROUTINE PROCESS
Bedside and Verbal shift change report given to ISABELA Monaco (oncoming nurse) by Iline Sandhoff. KRISTINE Phoenix (offgoing nurse). Report included the following information SBAR.     2000-0000: Hourly rounds completed.

## 2017-06-16 NOTE — DISCHARGE SUMMARY
Obstetrical Discharge Summary     Name: Elijah Desai MRN: [id number]  SSN: [id number]    YOB: 1990  Age: 33 y.o. Sex: female      Admit Date: 2017    Discharge Date: 2017     Admitting Physician: Teodoro Irby MD     Attending Physician:  Tyrone Marsh MD     Admission Diagnoses: MATERNITY  Normal labor    Discharge Diagnoses:   Information for the patient's :  Omer Mcclendon [[id number]]   Delivery of a 3.76 kg female infant via Vaginal, Spontaneous Delivery on 2017 at 2:40 PM  by . Apgars were 9 and 9. Additional Diagnoses:   Hospital Problems  Never Reviewed          Codes Class Noted POA    Normal labor ICD-10-CM: [de-identified], Z37.9  ICD-9-CM: 581  2017 Unknown             Lab Results   Component Value Date/Time    Rubella, External immune 2016    GrBStrep, External negative 2017       Hospital Course: Normal hospital course following the delivery. Disposition at Discharge: Home or self care    Discharged Condition: Stable    Patient Instructions:   Current Discharge Medication List      CONTINUE these medications which have NOT CHANGED    Details   prenatal vit-calcium-iron-fa (PRENATAL PLUS, CALCIUM CARB,) 27 mg iron- 1 mg tab Take 1 Tab by mouth daily. STOP taking these medications       ondansetron (ZOFRAN ODT) 4 mg disintegrating tablet Comments:   Reason for Stopping:               Reference my discharge instructions.     Follow-up Appointments   Procedures    FOLLOW UP VISIT Appointment in: 10 Weeks With Dr. Karime Inman record     With Dr. Karime Inman record     Standing Status:   Standing     Number of Occurrences:   1     Order Specific Question:   Appointment in     Answer:   6 Weeks        Signed By:  Mayra Dueñas CNM     2017

## 2017-07-24 ENCOUNTER — HOSPITAL ENCOUNTER (OUTPATIENT)
Dept: FAMILY PLANNING/WOMEN'S HEALTH CLINIC | Age: 27
Discharge: HOME | End: 2017-07-24

## 2017-07-24 NOTE — PROGRESS NOTES
[address]  [address]  [address] [place] [address], [address] [facility] [address]  Dept: [phone]    LACTATION REPORT TO HEALTHCARE PROVIDER    Date: 2017    Dear Dena Amend: This is to inform you that I have seen    Baby name (First Name, Last Name): Ming Arvizu                                  Mother: Temo Gutierresestic    Reason for Visit: difficulty latching; Other (Comment) (questionable low milk supply)    Baby date of birth: 17     Baby's Gestational age: 37.1   Birth Weight (Born Outside of [facility]): 3.771 kg (8 lb 5 oz)     Discharge weight: 3.515 kg (7 lb 12 oz)     Date                  Weight        Recorded Weight Date 1: 17  Recorded Weight 1: 4.706 kg (10 lb 6 oz)        Recorded  Weight Date 2: 17  Recorded Weight 2: 4.978 kg (10 lb 15.6 oz) (naked)    Pre-feed Weight: 4.995 kg (11 lb 0.2 oz)  Post-feed Weight Left Breast: 5.106 kg (11 lb 4.1 oz)  Post-feed Weight Right Breast: 5.069 kg (11 lb 2.8 oz)     Total amount of milk transferred: Total Weight Gain: 3.9 oz    Total time of feedin minutes    Amount of milk pumped (PC):  (did  not pump)    Amount of milk/formula supplemented: 0    Breast Assessment:  Left Breast: Medium  Left Nipple: Everted; Intact  Right Breast: Medium  Right Nipple: Everted; Intact    Oral assessement:  (labial lip tie and tongue tie noted)    Enclosed please find a copy of the instructions given to the patient. Mother in with baby for concerns of clicking heard during feeding and milk leaking from her mouth; believes that her latch is poor and thinks it may be affecting her supply. Oral assessment reveals a tongue tie and labial lip tie and high palate. Discussed with mother as possible cause for infant not making good seal at breast during feeding. Referred to Dr. Lynnette Garza at [facility] for evaluation. Reviewed good positioning and latch techniques and observed mother using good techniques.  Clicking heard during letdown and noted milk spilling form baby's mouth during feeding. Mother has no discomfort during feeding, and nipple round on release. Discussed milk supply and encouraged pumping if needed due to fullness before feeding or following feeding if she feels that breast is still full. Mother to have ENT consult then [facility]. Please feel free to contact me at [facility] with any questions or concerns.     Thank you,    Zane Richter RN IBCLC

## 2017-07-24 NOTE — DISCHARGE INSTRUCTIONS
HELPING BABY LATCH CORRECTLY                                             1. Stimulate the rooting reflex by making a circular motion around the lips, in one direction. Give baby verbal and visual cues by saying Farshad Pear and demonstrating with your face approximately 10 inches away from babys face to elicit a wide, open mouth. 2. Stimulate let down by massaging or pumping breasts for 1-2 minutes prior to latching. 3. Allow baby to suck clean finger, placed nail side down. 4. Place nursing pillow or bed pillows next to you high enough to bring baby to the level of the nipple. 5. Position baby belly to belly with you, making sure that head, neck and torso are in alignment. 6. Support babys head during latch. Babys body weight should rest comfortably on nursing pillow. 7. Roll the nipple gently between the fingers to make it stand out. 8. Fingers should be placed at the edge of areola and the breast should be compressed so that it enters parallel to babys open mouth, with the nipple slightly pointed toward the roof of the babys mouth. (Think of compressing a large sandwich to fit into your mouth!)   9. Wait for WIDE mouth, and then bring the baby to the breast, getting as much of the areola into the mouth as possible. Gentle downward pressure on the lower jaw as baby is coming to breast, may be helpful in keeping mouth open wide initially. 10. Hold the breast compressed and the baby gently in place to allow the baby to feel the nipple and begin suckling.  (U-hold may be helpful)  11. If baby has not latched correctly, begin the process again. 12. If baby is latched correctly, lips should be flanged outward, nose and chin should lightly   touch the breast.  No clicking or lip smacking should be heard. 13. Swallowing should be observed intermittently during entire feeding. Baby should pause   for breathing.   14. It is normal to have some brief discomfort initially when the baby latches, but it should lisa as the feeding progresses, particularly once the milk lets down. 15. If baby tends to compress nipple during feedings, un-latch after first let-down, roll nipples into round shape and re-latch. INFORMATION ON TONGUE TIE:    Tongue-tie is an anatomical abnormality affecting the ability of the tongue to move in an appropriate way to facilitate proper suckling at the breast. This may affect either the ability to stick the tongue out or to lift the tongue upward or both. Tongue-tie can sometimes be seen when the baby raises the tongue and sometimes can on ly be felt by an experienced medical professional on exam.    Henretta Norse can contribute to nipple pain and damage, ineffective breast emptying, tiring easily at the breast, short sleep cycles, reflux and colic, lip blisters, and poor weight gain. In a majority of cases,  tongue tie is accompanied by lip tie as well which prevents the upper lip from flanging out properly. Almost all tongue-tie and lip-tie can be treated with a simple medical procedure to release the tie. This procedure should be performed by an experienced medical professional. [facility] refers clients suspected of tongue or lip tie to Dr. Mary Velazco at [address] [place] [address], and [facility] located at [address] [place] [address], [address] [place] [address] Av. [phone]    For additional information on tongue and lip tie and post procedure exercises go to [facility]. Schedule evaluation by ENT for tongue and lip tie and follow up at [facility] or ENT as indicated/needed. Call [facility] with any questions or concerns.

## 2018-02-19 LAB
HBSAG, EXTERNAL: NEGATIVE
HIV, EXTERNAL: NON REACTIVE
RUBELLA, EXTERNAL: NORMAL
TYPE, ABO & RH, EXTERNAL: NORMAL

## 2018-07-09 LAB
ANTIBODY SCREEN, EXTERNAL: NEGATIVE
T. PALLIDUM, EXTERNAL: NEGATIVE

## 2018-09-06 LAB — GRBS, EXTERNAL: NEGATIVE

## 2018-09-26 ENCOUNTER — HOSPITAL ENCOUNTER (INPATIENT)
Age: 28
LOS: 2 days | Discharge: HOME | End: 2018-09-28
Attending: OBSTETRICS & GYNECOLOGY | Admitting: OBSTETRICS & GYNECOLOGY
Payer: COMMERCIAL

## 2018-09-26 ENCOUNTER — ANESTHESIA (OUTPATIENT)
Dept: LABOR AND DELIVERY | Age: 28
End: 2018-09-26
Payer: COMMERCIAL

## 2018-09-26 ENCOUNTER — ANESTHESIA EVENT (OUTPATIENT)
Dept: LABOR AND DELIVERY | Age: 28
End: 2018-09-26
Payer: COMMERCIAL

## 2018-09-26 LAB
ERYTHROCYTE [DISTWIDTH] IN BLOOD BY AUTOMATED COUNT: 14.5 % (ref 11.5–14.5)
HCT VFR BLD AUTO: 38.3 % (ref 35–47)
HGB BLD-MCNC: 12.6 G/DL (ref 11.5–16)
MCH RBC QN AUTO: 30.7 PG (ref 26–34)
MCHC RBC AUTO-ENTMCNC: 32.9 G/DL (ref 30–36.5)
MCV RBC AUTO: 93.2 FL (ref 80–99)
NRBC # BLD: 0 K/UL (ref 0–0.01)
NRBC BLD-RTO: 0 PER 100 WBC
PLATELET # BLD AUTO: 90 K/UL (ref 150–400)
RBC # BLD AUTO: 4.11 M/UL (ref 3.8–5.2)
WBC # BLD AUTO: 9.1 K/UL (ref 3.6–11)

## 2018-09-26 PROCEDURE — 36415 COLL VENOUS BLD VENIPUNCTURE: CPT | Performed by: OBSTETRICS & GYNECOLOGY

## 2018-09-26 PROCEDURE — 77030031139 HC SUT VCRL2 J&J -A

## 2018-09-26 PROCEDURE — 74011250637 HC RX REV CODE- 250/637: Performed by: OBSTETRICS & GYNECOLOGY

## 2018-09-26 PROCEDURE — 65410000002 HC RM PRIVATE OB

## 2018-09-26 PROCEDURE — 74011250636 HC RX REV CODE- 250/636

## 2018-09-26 PROCEDURE — 3E033VJ INTRODUCTION OF OTHER HORMONE INTO PERIPHERAL VEIN, PERCUTANEOUS APPROACH: ICD-10-PCS | Performed by: OBSTETRICS & GYNECOLOGY

## 2018-09-26 PROCEDURE — 77030005537 HC CATH URETH BARD -A

## 2018-09-26 PROCEDURE — A4300 CATH IMPL VASC ACCESS PORTAL: HCPCS

## 2018-09-26 PROCEDURE — 0KQM0ZZ REPAIR PERINEUM MUSCLE, OPEN APPROACH: ICD-10-PCS | Performed by: OBSTETRICS & GYNECOLOGY

## 2018-09-26 PROCEDURE — 74011250636 HC RX REV CODE- 250/636: Performed by: ANESTHESIOLOGY

## 2018-09-26 PROCEDURE — 00HU33Z INSERTION OF INFUSION DEVICE INTO SPINAL CANAL, PERCUTANEOUS APPROACH: ICD-10-PCS | Performed by: ANESTHESIOLOGY

## 2018-09-26 PROCEDURE — 74011250636 HC RX REV CODE- 250/636: Performed by: OBSTETRICS & GYNECOLOGY

## 2018-09-26 PROCEDURE — 74011000250 HC RX REV CODE- 250

## 2018-09-26 PROCEDURE — 85027 COMPLETE CBC AUTOMATED: CPT | Performed by: OBSTETRICS & GYNECOLOGY

## 2018-09-26 RX ORDER — ACETAMINOPHEN 325 MG/1
650 TABLET ORAL
Status: DISCONTINUED | OUTPATIENT
Start: 2018-09-26 | End: 2018-09-28 | Stop reason: HOSPADM

## 2018-09-26 RX ORDER — SIMETHICONE 80 MG
80 TABLET,CHEWABLE ORAL
Status: DISCONTINUED | OUTPATIENT
Start: 2018-09-26 | End: 2018-09-28 | Stop reason: HOSPADM

## 2018-09-26 RX ORDER — SODIUM CHLORIDE, SODIUM LACTATE, POTASSIUM CHLORIDE, CALCIUM CHLORIDE 600; 310; 30; 20 MG/100ML; MG/100ML; MG/100ML; MG/100ML
125 INJECTION, SOLUTION INTRAVENOUS CONTINUOUS
Status: DISCONTINUED | OUTPATIENT
Start: 2018-09-26 | End: 2018-09-26

## 2018-09-26 RX ORDER — FENTANYL CITRATE 50 UG/ML
100 INJECTION, SOLUTION INTRAMUSCULAR; INTRAVENOUS
Status: DISCONTINUED | OUTPATIENT
Start: 2018-09-26 | End: 2018-09-26

## 2018-09-26 RX ORDER — OXYTOCIN/0.9 % SODIUM CHLORIDE 30/500 ML
PLASTIC BAG, INJECTION (ML) INTRAVENOUS
Status: COMPLETED
Start: 2018-09-26 | End: 2018-09-26

## 2018-09-26 RX ORDER — FENTANYL CITRATE 50 UG/ML
INJECTION, SOLUTION INTRAMUSCULAR; INTRAVENOUS AS NEEDED
Status: DISCONTINUED | OUTPATIENT
Start: 2018-09-26 | End: 2018-09-26 | Stop reason: HOSPADM

## 2018-09-26 RX ORDER — DIPHENHYDRAMINE HCL 25 MG
25 CAPSULE ORAL
Status: DISCONTINUED | OUTPATIENT
Start: 2018-09-26 | End: 2018-09-28 | Stop reason: HOSPADM

## 2018-09-26 RX ORDER — AMMONIA 15 % (W/V)
1 AMPUL (EA) INHALATION AS NEEDED
Status: DISCONTINUED | OUTPATIENT
Start: 2018-09-26 | End: 2018-09-28 | Stop reason: HOSPADM

## 2018-09-26 RX ORDER — HYDROCORTISONE 1 %
CREAM (GRAM) TOPICAL AS NEEDED
Status: DISCONTINUED | OUTPATIENT
Start: 2018-09-26 | End: 2018-09-28 | Stop reason: HOSPADM

## 2018-09-26 RX ORDER — ONDANSETRON 4 MG/1
4 TABLET, ORALLY DISINTEGRATING ORAL
Status: DISCONTINUED | OUTPATIENT
Start: 2018-09-26 | End: 2018-09-28 | Stop reason: HOSPADM

## 2018-09-26 RX ORDER — HYDROCORTISONE ACETATE PRAMOXINE HCL 2.5; 1 G/100G; G/100G
CREAM TOPICAL AS NEEDED
Status: DISCONTINUED | OUTPATIENT
Start: 2018-09-26 | End: 2018-09-28 | Stop reason: HOSPADM

## 2018-09-26 RX ORDER — DOCUSATE SODIUM 100 MG/1
100 CAPSULE, LIQUID FILLED ORAL
Status: DISCONTINUED | OUTPATIENT
Start: 2018-09-26 | End: 2018-09-28 | Stop reason: HOSPADM

## 2018-09-26 RX ORDER — NALOXONE HYDROCHLORIDE 0.4 MG/ML
0.4 INJECTION, SOLUTION INTRAMUSCULAR; INTRAVENOUS; SUBCUTANEOUS AS NEEDED
Status: DISCONTINUED | OUTPATIENT
Start: 2018-09-26 | End: 2018-09-26

## 2018-09-26 RX ORDER — SODIUM CHLORIDE 0.9 % (FLUSH) 0.9 %
5-10 SYRINGE (ML) INJECTION EVERY 8 HOURS
Status: DISCONTINUED | OUTPATIENT
Start: 2018-09-26 | End: 2018-09-28 | Stop reason: HOSPADM

## 2018-09-26 RX ORDER — ONDANSETRON 2 MG/ML
4 INJECTION INTRAMUSCULAR; INTRAVENOUS
Status: DISCONTINUED | OUTPATIENT
Start: 2018-09-26 | End: 2018-09-26

## 2018-09-26 RX ORDER — IBUPROFEN 400 MG/1
800 TABLET ORAL EVERY 8 HOURS
Status: DISCONTINUED | OUTPATIENT
Start: 2018-09-26 | End: 2018-09-28 | Stop reason: HOSPADM

## 2018-09-26 RX ORDER — TERBUTALINE SULFATE 1 MG/ML
0.25 INJECTION SUBCUTANEOUS AS NEEDED
Status: DISCONTINUED | OUTPATIENT
Start: 2018-09-26 | End: 2018-09-26

## 2018-09-26 RX ORDER — EPHEDRINE SULFATE 50 MG/ML
10 INJECTION, SOLUTION INTRAVENOUS
Status: DISCONTINUED | OUTPATIENT
Start: 2018-09-26 | End: 2018-09-26

## 2018-09-26 RX ORDER — FENTANYL/BUPIVACAINE/NS/PF 2-1250MCG
10 PREFILLED PUMP RESERVOIR EPIDURAL CONTINUOUS
Status: DISCONTINUED | OUTPATIENT
Start: 2018-09-26 | End: 2018-09-26

## 2018-09-26 RX ORDER — NALBUPHINE HYDROCHLORIDE 10 MG/ML
10 INJECTION, SOLUTION INTRAMUSCULAR; INTRAVENOUS; SUBCUTANEOUS
Status: DISCONTINUED | OUTPATIENT
Start: 2018-09-26 | End: 2018-09-26

## 2018-09-26 RX ORDER — BUPIVACAINE HYDROCHLORIDE 2.5 MG/ML
30 INJECTION, SOLUTION EPIDURAL; INFILTRATION; INTRACAUDAL ONCE
Status: DISCONTINUED | OUTPATIENT
Start: 2018-09-26 | End: 2018-09-26

## 2018-09-26 RX ORDER — ONDANSETRON 2 MG/ML
INJECTION INTRAMUSCULAR; INTRAVENOUS
Status: COMPLETED
Start: 2018-09-26 | End: 2018-09-26

## 2018-09-26 RX ORDER — SODIUM CHLORIDE 0.9 % (FLUSH) 0.9 %
5-10 SYRINGE (ML) INJECTION AS NEEDED
Status: DISCONTINUED | OUTPATIENT
Start: 2018-09-26 | End: 2018-09-28 | Stop reason: HOSPADM

## 2018-09-26 RX ORDER — BUPIVACAINE HYDROCHLORIDE 2.5 MG/ML
INJECTION, SOLUTION EPIDURAL; INFILTRATION; INTRACAUDAL AS NEEDED
Status: DISCONTINUED | OUTPATIENT
Start: 2018-09-26 | End: 2018-09-26 | Stop reason: HOSPADM

## 2018-09-26 RX ORDER — OXYCODONE AND ACETAMINOPHEN 5; 325 MG/1; MG/1
1 TABLET ORAL
Status: DISCONTINUED | OUTPATIENT
Start: 2018-09-26 | End: 2018-09-28 | Stop reason: HOSPADM

## 2018-09-26 RX ORDER — OXYTOCIN/RINGER'S LACTATE 20/1000 ML
125-1000 PLASTIC BAG, INJECTION (ML) INTRAVENOUS AS NEEDED
Status: DISCONTINUED | OUTPATIENT
Start: 2018-09-26 | End: 2018-09-28 | Stop reason: HOSPADM

## 2018-09-26 RX ORDER — OXYTOCIN/0.9 % SODIUM CHLORIDE 30/500 ML
1-25 PLASTIC BAG, INJECTION (ML) INTRAVENOUS
Status: DISCONTINUED | OUTPATIENT
Start: 2018-09-26 | End: 2018-09-26

## 2018-09-26 RX ORDER — FENTANYL CITRATE 50 UG/ML
100 INJECTION, SOLUTION INTRAMUSCULAR; INTRAVENOUS ONCE
Status: DISCONTINUED | OUTPATIENT
Start: 2018-09-26 | End: 2018-09-26

## 2018-09-26 RX ADMIN — ONDANSETRON HYDROCHLORIDE 4 MG: 2 INJECTION INTRAMUSCULAR; INTRAVENOUS at 14:25

## 2018-09-26 RX ADMIN — SODIUM CHLORIDE, SODIUM LACTATE, POTASSIUM CHLORIDE, AND CALCIUM CHLORIDE 125 ML/HR: 600; 310; 30; 20 INJECTION, SOLUTION INTRAVENOUS at 12:47

## 2018-09-26 RX ADMIN — OXYTOCIN 2 MILLI-UNITS/MIN: 10 INJECTION, SOLUTION INTRAMUSCULAR; INTRAVENOUS at 08:19

## 2018-09-26 RX ADMIN — BUPIVACAINE HYDROCHLORIDE 5 ML: 2.5 INJECTION, SOLUTION EPIDURAL; INFILTRATION; INTRACAUDAL at 12:54

## 2018-09-26 RX ADMIN — Medication 2 MILLI-UNITS/MIN: at 08:19

## 2018-09-26 RX ADMIN — SODIUM CHLORIDE, SODIUM LACTATE, POTASSIUM CHLORIDE, AND CALCIUM CHLORIDE 1000 ML: 600; 310; 30; 20 INJECTION, SOLUTION INTRAVENOUS at 08:05

## 2018-09-26 RX ADMIN — IBUPROFEN 800 MG: 400 TABLET ORAL at 17:00

## 2018-09-26 RX ADMIN — ONDANSETRON 4 MG: 2 INJECTION INTRAMUSCULAR; INTRAVENOUS at 14:25

## 2018-09-26 RX ADMIN — FENTANYL CITRATE 100 MCG: 50 INJECTION, SOLUTION INTRAMUSCULAR; INTRAVENOUS at 12:56

## 2018-09-26 RX ADMIN — BUPIVACAINE HYDROCHLORIDE 5 ML: 2.5 INJECTION, SOLUTION EPIDURAL; INFILTRATION; INTRACAUDAL at 12:56

## 2018-09-26 RX ADMIN — Medication 10 ML/HR: at 13:00

## 2018-09-26 NOTE — H&P
History & Physical 
 
Name: Korey Blair MRN: [id number]  SSN: [id number] YOB: 1990  Age: 33 y.o. Sex: female Subjective:  
 
Pt is a 34 yo  at 39w3d presenting for elective IOL. Pregnancy has overall been uncomplicated aside from a hx of an abnormal 1hr GTT. 3hr GTT has been WNL. Of note 1st delivery was complicated by a 47RP vaginal hematoma that was managed expectantly although a blood transfusion was necessary. Pt had a 2nd  subsequently with no issues. Pt is GBS neg. Estimated Date of Delivery: 18 OB History  Para Term  AB Living 3 2 2   2 SAB TAB Ectopic Molar Multiple Live Births  
    0 2 # Outcome Date GA Lbr Jose/2nd Weight Sex Delivery Anes PTL Lv  
3 Current 2 Term 17 40w4d 09:50 / 00:20 3.76 kg F Vag-Spont EPIDURAL AN N JARROD  
1 Term 11/10/14 39w4d / 02:15 3.845 kg F VAGINAL DELI EPIDURAL AN N JARROD History reviewed. No pertinent past medical history. Past Surgical History:  
Procedure Laterality Date  HX OTHER SURGICAL    
 tonsillectomy  HX OTHER SURGICAL    
 wisdom teeth removed  HX TONSILLECTOMY    
 age 9   
 [address]TRA[address]   Social History Occupational History  Not on file. Social History Main Topics  Smoking status: Never Smoker  Smokeless tobacco: Never Used  Alcohol use No  
 Drug use: No  
 Sexual activity: Yes  
  Partners: Male Birth control/ protection: None Family History Problem Relation Age of Onset  Cancer Paternal Grandmother  Hypertension Paternal Grandmother Allergies Allergen Reactions  Sulfa (Sulfonamide Antibiotics) Hives Prior to Admission medications Medication Sig Start Date End Date Taking? Authorizing Provider  
prenatal vit-calcium-iron-fa (PRENATAL PLUS, CALCIUM CARB,) 27 mg iron- 1 mg tab Take 1 Tab by mouth daily. Yes Historical Provider Review of Systems: A comprehensive review of systems was negative except for that written in the History of Present Illness. Objective:  
 
Vitals: 
Vitals:  
 18 0082 18 2134 BP: 133/88 Pulse: (!) 106 Resp: 14 Temp: 98.1 °F (36.7 °C) Weight:  93 kg (205 lb) Height:  5' 6\" (1.676 m) Physical Exam: 
Heart: Regular rate and rhythm Lung: clear to auscultation throughout lung fields, no wheezes, no rales, no rhonchi and normal respiratory effort Abdomen: soft, nontender Fundus: soft and non tender Cervical Exam: 350/-3 Presenting Part: cephalic Lower Extremities:  - Edema No 
Membranes:  Intact Prenatal Labs:  
Lab Results Component Value Date/Time ABO/Rh(D) O POSITIVE 11/10/2014 04:15 AM  
 Rubella, External Immune 2018 HBsAg, External Negative 2018 HIV, External Non reactive 2018 ABO,Rh O + 2018 GrBStrep, External Negative 2018 Impression/Plan: Active Problems: 
  Normal labor (2017) Plan: Admit for induction of labor. Group B Strep negative. IVF 
[facility]M/[place] 
Pitocin IOL 
AROM PRN Epidural PRN. Anticipate . Signed By:  Mayra King MD   
 2018

## 2018-09-26 NOTE — ANESTHESIA PROCEDURE NOTES
Epidural Block Start time: 9/26/2018 12:45 PM 
End time: 9/26/2018 12:57 PM 
Performed by: Jacky Blas Authorized by: Jacky Blas  
 
Pre-Procedure Indication: labor epidural   
Preanesthetic Checklist: patient identified, risks and benefits discussed, anesthesia consent, site marked, patient being monitored, timeout performed and anesthesia consent Timeout Time: 12:45 Epidural:  
Patient position:  Seated Prep region:  Lumbar Prep: Patient draped and DuraPrep Location:  L2-3 Needle and Epidural Catheter:  
Needle Type:  Tuohy Needle Gauge:  17 G Injection Technique:  Loss of resistance using air Attempts:  1 Catheter Size:  19 G Events: no blood with aspiration, no cerebrospinal fluid with aspiration, no paresthesia and negative aspiration test   
Test Dose:  Bupivacaine 0.25% and negative Assessment:  
Catheter Secured:  Tegaderm and tape Insertion:  Uncomplicated Patient tolerance:  Patient tolerated the procedure well with no immediate complications

## 2018-09-26 NOTE — IP AVS SNAPSHOT
[address] [facility] [address] [facility] 
[phone] Patient: Yancy Mcintosh MRN: [id number]  About your hospitalization You were admitted on:  2018 You last received care in the:  [address] [facility] You were discharged on:  2018 Why you were hospitalized Your primary diagnosis was:  Not on File Your diagnoses also included:  Normal Labor Follow-up Information Follow up With Details Comments Contact Info Ric Tom MD Schedule an appointment as soon as possible for a visit in 7 weeks  [facility] [address] [facility] 
[phone] Discharge Orders None A check lizzie indicates which time of day the medication should be taken. My Medications START taking these medications Instructions Each Dose to Equal  
 Morning Noon Evening Bedtime  
 ibuprofen 600 mg tablet Commonly known as:  MOTRIN Your last dose was: Your next dose is: Take 1 Tab by mouth every six (6) hours as needed for Pain. 600 mg CONTINUE taking these medications Instructions Each Dose to Equal  
 Morning Noon Evening Bedtime PRENATAL PLUS (CALCIUM CARB) 27 mg iron- 1 mg Tab Generic drug:  prenatal vit-calcium-iron-fa Your last dose was: Your next dose is: Take 1 Tab by mouth daily. 1 Tab Where to Get Your Medications Information on where to get these meds will be given to you by the nurse or doctor. ! Ask your nurse or doctor about these medications  
  ibuprofen 600 mg tablet Discharge Instructions POSTPARTUM DISCHARGE INSTRUCTIONS Name:  Yancy Mcintosh YOB: 1990 Admission Diagnosis:  Normal labor Discharge Diagnosis:   
Problem List as of 2018  Never Reviewed Codes Class Noted - Resolved Normal labor ICD-10-CM: O80, Z37.9 ICD-9-CM: 742  6/14/2017 - Present Vaginal hematoma ICD-10-CM: N89.8 ICD-9-CM: 623.6  11/13/2014 - Present RESOLVED: PROM (premature rupture of membranes) ICD-10-CM: O42.90 ICD-9-CM: 658.10  11/10/2014 - 11/13/2014 RESOLVED: Active labor ICD-10-CM: Carolann Hernandez ICD-9-CM: Carolann Hernandez  11/9/2014 - 11/13/2014 Attending Physician:  Ric Tom MD 
 
Delivery Type:  Vaginal Childbirth: What To Expect At Home Your Recovery: Your body will slowly heal in the next few weeks. It is easy to get too tired and overwhelmed during the first weeks after your baby is born. Changes in your hormones can shift your mood without warning. You may find it hard to meet the extra demands on your energy and time. Take it easy on yourself. Follow-up care is a key part of your treatment and safety. Be sure to make and go to all appointments, and call your doctor if you are having problems. It's also a good idea to know your test results and keep a list of the medicines you take. How can you care for yourself at home? Vaginal bleeding and cramps · After delivery, you will have a bloody discharge from the vagina. This will turn pink within a week and then white or yellow after about 10 days. It may last for 2 to 4 weeks or longer, until the uterus has healed. Use pads instead of tampons until you stop bleeding. · Do not worry if you pass some blood clots, as long as they are smaller than a golf ball. If you have a tear or stitches in your vaginal area, change the pad at least every 4 hours to prevent soreness and infection. · You may have cramps for the first few days after childbirth. These are normal and occur as the uterus shrinks to normal size. Take an over-the-counter pain medicine, such as acetaminophen (Tylenol), ibuprofen (Advil, Motrin), or naproxen (Aleve), for cramps.  Read and follow all instructions on the label. Do not take aspirin, because it can cause more bleeding. Do not take acetaminophen (Tylenol) and other acetaminophen containing medications (i.e. Percocet) at the same time. Breast fullness · Your breasts may overfill (engorge) in the first few days after delivery. To help milk flow and to relieve pain, warm your breasts in the shower or by using warm, moist towels before nursing. · If you are not nursing, do not put warmth on your breasts or touch your breasts. Wear a tight bra or sports bra and use ice until the fullness goes away. This usually takes 2 to 3 days. · Put ice or a cold pack on your breast after nursing to reduce swelling and pain. Put a thin cloth between the ice and your skin. Activity · Eat a balanced diet. Do not try to lose weight by cutting calories. Keep taking your prenatal vitamins, or take a multivitamin. · Get as much rest as you can. Try to take naps when your baby sleeps during the day. · Get some exercise every day. But do not do any heavy exercise until your doctor says it is okay. · Wait until you are healed (about 4 to 6 weeks) before you have sexual intercourse. Your doctor will tell you when it is okay to have sex. · Talk to your doctor about birth control. You can get pregnant even before your period returns. Also, you can get pregnant while you are breast-feeding. Mental Health · Many women get the \"baby blues\" during the first few days after childbirth. You may lose sleep, feel irritable, and cry easily. You may feel happy one minute and sad the next. Hormone changes are one cause of these emotional changes. Also, the demands of a new baby, along with visits from relatives or other family needs, add to a mother's stress. The \"baby blues\" often peak around the fourth day. Then they ease up in less than 2 weeks.  
· If your moodiness or anxiety lasts for more than 2 weeks, or if you feel like life is not worth living, you may have postpartum depression. This is different for each mother. Some mothers with serious depression may worry intensely about their infant's well-being. Others may feel distant from their child. Some mothers might even feel that they might harm their baby. A mother may have signs of paranoia, wondering if someone is watching her. · With all the changes in your life, you may not know if you are depressed. Pregnancy sometimes causes changes in how you feel that are similar to the symptoms of depression. · Symptoms of depression include: · Feeling sad or hopeless and losing interest in daily activities. These are the most common symptoms of depression. · Sleeping too much or not enough. · Feeling tired. You may feel as if you have no energy. · Eating too much or too little. · POSTPARTUM SUPPORT INTERNATIONAL (PSI) offers a Warm line; Chat with the Expert phone sessions; Information and Articles about Pregnancy and Postpartum Mood Disorders; Comprehensive List of Free Support Groups; Knowledgeable local coordinators who will offer support, information, and resources; Guide to Resources on [employer]; Calendar of events in the  mood disorders community; Latest News and Research; and [facility] [address] for [employer]. Remember - You are not alone; You are not to blame; With help, you will be well. 3-724-220-PPD(2834). WWW. POSTPARTUM. NET · Writing or talking about death, such as writing suicide notes or talking about guns, knives, or pills. Keep the numbers for these national suicide hotlines: 2-371-844-TALK (4-167.683.7425) and 5-071-MENPVXX (8-113.140.6908). If you or someone you know talks about suicide or feeling hopeless, get help right away. Constipation and Hemorrhoids · Drink plenty of fluids, enough so that your urine is light yellow or clear like water.  If you have kidney, heart, or liver disease and have to limit fluids, talk with your doctor before you increase the amount of fluids you drink. · Eat plenty of fiber each day. Have a bran muffin or bran cereal for breakfast, and try eating a piece of fruit for a mid-afternoon snack. · For painful, itchy hemorrhoids, put ice or a cold pack on the area several times a day for 10 minutes at a time. Follow this by putting a warm compress on the area for another 10 to 20 minutes or by sitting in a shallow, warm bath. When should you call for help? Call 911 anytime you think you may need emergency care. For example, call if: 
· You are thinking of hurting yourself, your baby, or anyone else. · You passed out (lost consciousness). · You have symptoms of a blood clot in your lung (called a pulmonary embolism). These may include:   
· Sudden chest pain. · Trouble breathing. · Coughing up blood. Call your doctor now or seek immediate medical care if: 
· You have severe vaginal bleeding. · You are soaking through a pad each hour for 2 or more hours. · Your vaginal bleeding seems to be getting heavier or is still bright red 4 days after delivery. · You are dizzy or lightheaded, or you feel like you may faint. · You are vomiting or cannot keep fluids down. · You have a fever. · You have new or more belly pain. · You pass tissue (not just blood). · Your vaginal discharge smells bad. · Your belly feels tender or full and hard. · Your breasts are continuously painful or red. · You feel sad, anxious, or hopeless for more than a few days. · You have sudden, severe pain in your belly. · You have symptoms of a blood clot in your leg (called a deep vein thrombosis),  
       such as: 
· Pain in your calf, back of the knee, thigh, or groin. · Redness and swelling in your leg or groin. · You have symptoms of preeclampsia, such as: 
· Sudden swelling of your face, hands, or feet. · New vision problems (such as dimness or blurring). · A severe headache. · Your blood pressure is higher than it should be or rises suddenly. · You have new nausea or vomiting. Watch closely for changes in your health, and be sure to contact your doctor if you have any problems. Additional Information:  Not applicable These are general instructions for a healthy lifestyle: No smoking/ No tobacco products/ Avoid exposure to second hand smoke Surgeon General's Warning:  Quitting smoking now greatly reduces serious risk to your health. Obesity, smoking, and sedentary lifestyle greatly increases your risk for illness A healthy diet, regular physical exercise & weight monitoring are important for maintaining a healthy lifestyle Recognize signs and symptoms of STROKE: 
 
F-face looks uneven A-arms unable to move or move unevenly S-speech slurred or non-existent T-time-call 911 as soon as signs and symptoms begin - DO NOT go  
    back to bed or wait to see if you get better - TIME IS BRAIN. I have had the opportunity to make my options or choices for discharge. I have received and understand these instructions. hopTo Announcement We are excited to announce that we are making your provider's discharge notes available to you in hopTo. You will see these notes when they are completed and signed by the physician that discharged you from your recent hospital stay. If you have any questions or concerns about any information you see in hopTo, please call the Health Information Department where you were seen or reach out to your Primary Care Provider for more information about your plan of care. Introducing [facility]! [facility] introduces hopTo patient portal. Now you can access parts of your medical record, email your doctor's office, and request medication refills online. 1. In your internet browser, go to [email or web address] 2. Click on the First Time User? Click Here link in the Sign In box. You will see the New Member Sign Up page. 3. Enter your [employer] Access Code exactly as it appears below. You will not need to use this code after youve completed the sign-up process. If you do not sign up before the expiration date, you must request a new code. · [employer] Access Code: [email or web address] Expires: 10/7/2018  2:34 PM 
 
4. Enter the last four digits of your Social Security Number (xxxx) and Date of Birth (mm/dd/yyyy) as indicated and click Submit. You will be taken to the next sign-up page. 5. Create a [employer] ID. This will be your [employer] login ID and cannot be changed, so think of one that is secure and easy to remember. 6. Create a [employer] password. You can change your password at any time. 7. Enter your Password Reset Question and Answer. This can be used at a later time if you forget your password. 8. Enter your e-mail address. You will receive e-mail notification when new information is available in [address] 9. Click Sign Up. You can now view and download portions of your medical record. 10. Click the Download Summary menu link to download a portable copy of your medical information. If you have questions, please visit the Frequently Asked Questions section of the [employer] website. Remember, [employer] is NOT to be used for urgent needs. For medical emergencies, dial 911. Now available from your iPhone and Android! Introducing Damion Soto As a [employer] patient, I wanted to make you aware of our electronic visit tool called Damion Soto. [email or web address] 24/7 allows you to connect within minutes with a medical provider 24 hours a day, seven days a week via a mobile device or tablet or logging into a secure website from your computer. You can access Damion Soto from anywhere in the [place].  
 
A virtual visit might be right for you when you have a simple condition and feel like you just dont want to get out of bed, or cant get away from work for an appointment, when your regular [employer] provider is not available (evenings, weekends or holidays), or when youre out of town and need minor care. Electronic visits cost only $49 and if the [employer] 24/7 provider determines a prescription is needed to treat your condition, one can be electronically transmitted to a nearby pharmacy*. Please take a moment to enroll today if you have not already done so. The enrollment process is free and takes just a few minutes. To enroll, please download the [employer] 24/7 yoel to your tablet or phone, or visit [email or web address] to enroll on your computer. And, as an [address] [place] [address] patient with a [employer] account, the results of your visits will be scanned into your electronic medical record and your primary care provider will be able to view the scanned results. We urge you to continue to see your regular [employer] provider for your ongoing medical care. And while your primary care provider may not be the one available when you seek a [employer] virtual visit, the peace of mind you get from getting a real diagnosis real time can be priceless. For more information on [employer], view our Frequently Asked Questions (FAQs) at [email or web address]. Sincerely, 
 
Florinda Paredes MD 
Chief Medical Officer [employer] *:  certain medications cannot be prescribed via [employer] Providers Seen During Your Hospitalization Provider Specialty Primary office phone Emil Washington MD Obstetrics & Gynecology [phone] Immunizations Administered for This Admission Name Date Influenza Vaccine (Quad) PF 9/27/2018 Your Primary Care Physician (PCP) Primary Care Physician Office Phone Office Fax Jimmy Rincon [phone] You are allergic to the following Allergen Reactions Sulfa (Sulfonamide Antibiotics) Hives Recent Documentation Height Weight Breastfeeding? BMI OB Status Smoking Status 1.676 m 93 kg Unknown 33.09 kg/m2 Recent pregnancy Never Smoker Emergency Contacts Name Discharge Info Relation Home Work Mobile Juan Manuel Kruse  Other Relative [6] [phone] Patient Belongings The following personal items are in your possession at time of discharge: 
  Dental Appliances: None  Visual Aid: None      Home Medications: None   Jewelry: Earrings  Clothing: At bedside    Other Valuables: None  Personal Items Sent to Safe: None Please provide this summary of care documentation to your next provider. Signatures-by signing, you are acknowledging that this After Visit Summary has been reviewed with you and you have received a copy. Patient Signature:  ____________________________________________________________ Date:  ____________________________________________________________  
  
[facility] Provider Signature:  ____________________________________________________________ Date:  ____________________________________________________________

## 2018-09-26 NOTE — IP AVS SNAPSHOT
[address] 
[phone] Patient: Aly Gomez MRN: [id number] NDZ:[birth year] A check lizzie indicates which time of day the medication should be taken. My Medications START taking these medications Instructions Each Dose to Equal  
 Morning Noon Evening Bedtime  
 ibuprofen 600 mg tablet Commonly known as:  MOTRIN Your last dose was: Your next dose is: Take 1 Tab by mouth every six (6) hours as needed for Pain. 600 mg CONTINUE taking these medications Instructions Each Dose to Equal  
 Morning Noon Evening Bedtime PRENATAL PLUS (CALCIUM CARB) 27 mg iron- 1 mg Tab Generic drug:  prenatal vit-calcium-iron-fa Your last dose was: Your next dose is: Take 1 Tab by mouth daily. 1 Tab Where to Get Your Medications Information on where to get these meds will be given to you by the nurse or doctor. ! Ask your nurse or doctor about these medications  
  ibuprofen 600 mg tablet

## 2018-09-26 NOTE — PROGRESS NOTES
0730 Bedside shift change report given to JUMANA Smith RN (oncoming nurse) by ISABELA Lui RN (offgoing nurse). Report included the following information SBAR, Intake/Output, MAR and Recent Results. 7106 Dr Ileana Peña at bedside 1228 Patient requesting epidural. LR bolus started for epidural 
 
1245 Dr Harrison More on way for epidural 
 
1247 Dr Harrison More in room for epidural 
 
[phone] Dr Ileana Peña at bedside and reviewed fhr strip 12 Dr Ileana Peña at bedside for delivery 0 Transferred patient to MIU. Isabela Robles RN will call for report, RN in another patients room at the time. Brief report given to ELOISE Martinez. 1810 TRANSFER - OUT REPORT: 
 
Verbal report given to HANK Moe RN(name) on Ginnie Koyanagi  being transferred to MIU(unit) for routine progression of care Report consisted of patients Situation, Background, Assessment and  
Recommendations(SBAR). Information from the following report(s) SBAR, Intake/Output, MAR and Recent Results was reviewed with the receiving nurse. Lines:  
Peripheral IV 09/26/18 Left Hand (Active) Site Assessment Clean, dry, & intact 9/26/2018  7:09 AM  
Phlebitis Assessment 0 9/26/2018  7:09 AM  
Infiltration Assessment 0 9/26/2018  7:09 AM  
Dressing Status Clean, dry, & intact 9/26/2018  7:09 AM  
Dressing Type Tape;Transparent 9/26/2018  7:09 AM  
Hub Color/Line Status Pink; Infusing;Patent 9/26/2018  7:09 AM  
Action Taken Blood drawn 9/26/2018  7:09 AM  
  
 
Opportunity for questions and clarification was provided. Patient transported with: 
 Registered Nurse

## 2018-09-26 NOTE — PROGRESS NOTES
Delivery Summary Patient: Sedonia Bile Circumcision:   desires Additional Delivery Comments - Shoulder dystocia resolved with Esperanza and Suprapubic maneuvers. Hx L vaginal hematoma, s/p G1, neg vaginal exam PP. Small 2nd degree repaired. Information for the patient's :  Jennifer Ibarra [[id number]] Delivery Type: Vaginal, Spontaneous Delivery Delivery Date: 2018   Delivery Time: 3:04 PM  
 
Birth Weight:    
 
Sex:  male Delivery Clinician:  Odilon Fernandes Gestational Age: Gestational Age: 38w3d Presentation: Vertex Position:          
 
Apgars were 8  and 9 Resuscitation Method: Suctioning-bulb; Tactile Stimulation Meconium Stained: None Living Status: Living Placenta Date/Time:   3:08 PM  
Placenta Removal: Spontaneous Placenta Appearance: Vertex [1] Cord Information: 3 Vessels Cord Events: None Cord Blood Sent?:  Yes Blood Gases Sent?:  No  
 
 
Cord pH:  none Episiotomy: None Laceration(s): Second degree perineal    
Estimated Blood Loss (ml): 250mL Labor Events Method: Oxytocin Augmentation: None Cervical Ripening:       None Operative Vaginal Delivery - none

## 2018-09-26 NOTE — PROGRESS NOTES
3390: Assumed care of patient for scheduled induction. 0730: Bedside, Verbal and Written shift change report given to JUMANA Murillo RN (oncoming nurse) by ISABELA Kwong RN (offgoing nurse). Report included the following information SBAR, MAR and Recent Results.

## 2018-09-26 NOTE — ANESTHESIA POSTPROCEDURE EVALUATION
Post-Anesthesia Evaluation and Assessment Patient: Chiquita Moses MRN: [id number]  SSN: [id number] YOB: 1990  Age: 33 y.o. Sex: female Cardiovascular Function/Vital Signs Visit Vitals  /63  Pulse (!) 107  Temp 37.1 °C (98.8 °F)  Resp 16  
 Ht 5' 6\" (1.676 m)  Wt 93 kg (205 lb)  Breastfeeding No  
 BMI 33.09 kg/m2 Patient is status post epidural anesthesia for * No procedures listed *. Nausea/Vomiting: None Postoperative hydration reviewed and adequate. Pain: 
Pain Scale 1: Labor Algorithm/Pain Intensity (09/26/18 1359) Pain Intensity 1: 0 (09/26/18 0800) Managed Neurological Status:  
Neuro (WDL): Within Defined Limits (09/26/18 0730) At baseline Mental Status and Level of Consciousness: Arousable Pulmonary Status:  
   
Adequate oxygenation and airway patent Complications related to anesthesia: None Post-anesthesia assessment completed. No concerns Signed By: Mary Sarmiento MD   
 September 26, 2018

## 2018-09-26 NOTE — PROGRESS NOTES
In room to see patient. Feeling some pressure. Visit Vitals  /80  Pulse 98  Temp 98.5 °F (36.9 °C)  Resp 16  
 Ht 5' 6\" (1.676 m)  Wt 93 kg (205 lb)  Breastfeeding No  
 BMI 33.09 kg/m2 SVE: 8/C/-1 
EFM: Cat 1, early decels noted. [place]: q2-3 mins A/P: Pt is a 34 yo  in active labor. Continue pitocin IOL Comfortable with epidural. 
Anticipate

## 2018-09-26 NOTE — ROUTINE PROCESS
TRANSFER - IN REPORT: 
 
Verbal report received from JUMANA Maldonado RN(name) on Alonzo Smirosas  being received from L&D(unit) for routine progression of care Report consisted of patients Situation, Background, Assessment and  
Recommendations(SBAR). Information from the following report(s) SBAR, Procedure Summary, Intake/Output, MAR and Recent Results was reviewed with the receiving nurse. Opportunity for questions and clarification was provided. Assessment completed upon patients arrival to unit and care assumed.

## 2018-09-27 PROCEDURE — 90471 IMMUNIZATION ADMIN: CPT

## 2018-09-27 PROCEDURE — 76060000078 HC EPIDURAL ANESTHESIA

## 2018-09-27 PROCEDURE — 74011250637 HC RX REV CODE- 250/637: Performed by: OBSTETRICS & GYNECOLOGY

## 2018-09-27 PROCEDURE — 75410000003 HC RECOV DEL/VAG/CSECN EA 0.5 HR

## 2018-09-27 PROCEDURE — 75410000000 HC DELIVERY VAGINAL/SINGLE

## 2018-09-27 PROCEDURE — 74011250636 HC RX REV CODE- 250/636: Performed by: OBSTETRICS & GYNECOLOGY

## 2018-09-27 PROCEDURE — 90686 IIV4 VACC NO PRSV 0.5 ML IM: CPT | Performed by: OBSTETRICS & GYNECOLOGY

## 2018-09-27 PROCEDURE — 65410000002 HC RM PRIVATE OB

## 2018-09-27 PROCEDURE — 75410000002 HC LABOR FEE PER 1 HR

## 2018-09-27 RX ORDER — IBUPROFEN 600 MG/1
600 TABLET ORAL
Qty: 30 TAB | Refills: 6 | Status: SHIPPED | OUTPATIENT
Start: 2018-09-27 | End: 2019-03-16

## 2018-09-27 RX ADMIN — IBUPROFEN 800 MG: 400 TABLET ORAL at 21:41

## 2018-09-27 RX ADMIN — IBUPROFEN 800 MG: 400 TABLET ORAL at 03:37

## 2018-09-27 RX ADMIN — IBUPROFEN 800 MG: 400 TABLET ORAL at 13:07

## 2018-09-27 RX ADMIN — INFLUENZA VIRUS VACCINE 0.5 ML: 15; 15; 15; 15 SUSPENSION INTRAMUSCULAR at 07:37

## 2018-09-27 RX ADMIN — DOCUSATE SODIUM 100 MG: 100 CAPSULE, LIQUID FILLED ORAL at 20:42

## 2018-09-27 NOTE — ROUTINE PROCESS
1600- Bedside shift change report given to SHIRA Medrano RN (oncoming nurse) by Vidal Phoenix RN (offgoing nurse). Report included the following information SBAR.

## 2018-09-27 NOTE — ROUTINE PROCESS
Bedside and Verbal shift change report given to Flower Bain RN (oncoming nurse) by Carri Sainz RN (offgoing nurse). Report included the following information SBAR.

## 2018-09-27 NOTE — LACTATION NOTE
This note was copied from a baby's chart. Baby nursing well today,  deep latch obtained, mother is comfortable, baby feeding vigorously with rhythmic suck, swallow, breathe pattern, audible swallowing, and evident milk transfer, both breasts offered, baby is asleep following feeding. Mom said her nipples are beginning to be sore. I encouraged her to bring the baby close while nursing and hold him close while nursing to prevent him from slipping down the nipple. Feeding Plan: Mother will keep baby skin to skin as often as possible, feed on demand, respond to feeding cues, obtain latch, listen for audible swallowing, be aware of signs of oxytocin release/ cramping,thrist,sleepyness while breastfeeding, offer both breasts,and will not limit feedings. Mom should allow baby to completely finish one breast and then offer the second breast at each feeding.

## 2018-09-27 NOTE — ROUTINE PROCESS
0730: Bedside shift change report given to CHIP Phoenix RN (oncoming nurse) by Joe Malloy RN (offgoing nurse). Report included the following information SBAR.

## 2018-09-28 VITALS
RESPIRATION RATE: 18 BRPM | WEIGHT: 205 LBS | HEART RATE: 106 BPM | OXYGEN SATURATION: 97 % | BODY MASS INDEX: 32.95 KG/M2 | TEMPERATURE: 98.1 F | SYSTOLIC BLOOD PRESSURE: 127 MMHG | HEIGHT: 66 IN | DIASTOLIC BLOOD PRESSURE: 84 MMHG

## 2018-09-28 PROCEDURE — 74011250637 HC RX REV CODE- 250/637: Performed by: OBSTETRICS & GYNECOLOGY

## 2018-09-28 RX ADMIN — IBUPROFEN 800 MG: 400 TABLET ORAL at 08:22

## 2018-09-28 NOTE — DISCHARGE INSTRUCTIONS
POSTPARTUM DISCHARGE INSTRUCTIONS       Name:  Aaron Bender  YOB: 1990  Admission Diagnosis:  Normal labor     Discharge Diagnosis:    Problem List as of 9/27/2018  Never Reviewed          Codes Class Noted - Resolved    Normal labor ICD-10-CM: O80, Z37.9  ICD-9-CM: 039  6/14/2017 - Present        Vaginal hematoma ICD-10-CM: N89.8  ICD-9-CM: 623.6  11/13/2014 - Present        RESOLVED: PROM (premature rupture of membranes) ICD-10-CM: O42.90  ICD-9-CM: 658.10  11/10/2014 - 11/13/2014        RESOLVED: Active labor ICD-10-CM: UDV6854  ICD-9-CM: Zora Amarilis  11/9/2014 - 11/13/2014            Attending Physician:  Jolanta Hernandez MD    Delivery Type:  Vaginal Childbirth: What To Expect At Home    Your Recovery: Your body will slowly heal in the next few weeks. It is easy to get too tired and overwhelmed during the first weeks after your baby is born. Changes in your hormones can shift your mood without warning. You may find it hard to meet the extra demands on your energy and time. Take it easy on yourself. Follow-up care is a key part of your treatment and safety. Be sure to make and go to all appointments, and call your doctor if you are having problems. It's also a good idea to know your test results and keep a list of the medicines you take. How can you care for yourself at home? Vaginal bleeding and cramps  · After delivery, you will have a bloody discharge from the vagina. This will turn pink within a week and then white or yellow after about 10 days. It may last for 2 to 4 weeks or longer, until the uterus has healed. Use pads instead of tampons until you stop bleeding. · Do not worry if you pass some blood clots, as long as they are smaller than a golf ball. If you have a tear or stitches in your vaginal area, change the pad at least every 4 hours to prevent soreness and infection. · You may have cramps for the first few days after childbirth.  These are normal and occur as the uterus shrinks to normal size. Take an over-the-counter pain medicine, such as acetaminophen (Tylenol), ibuprofen (Advil, Motrin), or naproxen (Aleve), for cramps. Read and follow all instructions on the label. Do not take aspirin, because it can cause more bleeding. Do not take acetaminophen (Tylenol) and other acetaminophen containing medications (i.e. Percocet) at the same time. Breast fullness  · Your breasts may overfill (engorge) in the first few days after delivery. To help milk flow and to relieve pain, warm your breasts in the shower or by using warm, moist towels before nursing. · If you are not nursing, do not put warmth on your breasts or touch your breasts. Wear a tight bra or sports bra and use ice until the fullness goes away. This usually takes 2 to 3 days. · Put ice or a cold pack on your breast after nursing to reduce swelling and pain. Put a thin cloth between the ice and your skin. Activity  · Eat a balanced diet. Do not try to lose weight by cutting calories. Keep taking your prenatal vitamins, or take a multivitamin. · Get as much rest as you can. Try to take naps when your baby sleeps during the day. · Get some exercise every day. But do not do any heavy exercise until your doctor says it is okay. · Wait until you are healed (about 4 to 6 weeks) before you have sexual intercourse. Your doctor will tell you when it is okay to have sex. · Talk to your doctor about birth control. You can get pregnant even before your period returns. Also, you can get pregnant while you are breast-feeding. Mental Health  · Many women get the \"baby blues\" during the first few days after childbirth. You may lose sleep, feel irritable, and cry easily. You may feel happy one minute and sad the next. Hormone changes are one cause of these emotional changes. Also, the demands of a new baby, along with visits from relatives or other family needs, add to a mother's stress.  The \"baby blues\" often peak around the fourth day. Then they ease up in less than 2 weeks. · If your moodiness or anxiety lasts for more than 2 weeks, or if you feel like life is not worth living, you may have postpartum depression. This is different for each mother. Some mothers with serious depression may worry intensely about their infant's well-being. Others may feel distant from their child. Some mothers might even feel that they might harm their baby. A mother may have signs of paranoia, wondering if someone is watching her. · With all the changes in your life, you may not know if you are depressed. Pregnancy sometimes causes changes in how you feel that are similar to the symptoms of depression. · Symptoms of depression include:  · Feeling sad or hopeless and losing interest in daily activities. These are the most common symptoms of depression. · Sleeping too much or not enough. · Feeling tired. You may feel as if you have no energy. · Eating too much or too little. · [employer] ([employer]) offers a Warm line; Chat with the Expert phone sessions; Information and Articles about Pregnancy and Postpartum Mood Disorders; Comprehensive List of Free Support Groups; Knowledgeable local coordinators who will offer support, information, and resources; Guide to Resources on [employer]Lex; Calendar of events in the  mood disorders community; Latest News and Research; and [facility] [address] for [employer]. Remember - You are not alone; You are not to blame; With help, you will be well. 8-922-683-PPD(0813). WWW. POSTPARTUM. NET   · Writing or talking about death, such as writing suicide notes or talking about guns, knives, or pills. Keep the numbers for these national suicide hotlines: 9-464-392-TALK (4-243-486-4743) and 8-838-ENGWLYA (7-380-898-713.291.2734). If you or someone you know talks about suicide or feeling hopeless, get help right away.     Constipation and Hemorrhoids  · Drink plenty of fluids, enough so that your urine is light yellow or clear like water. If you have kidney, heart, or liver disease and have to limit fluids, talk with your doctor before you increase the amount of fluids you drink. · Eat plenty of fiber each day. Have a bran muffin or bran cereal for breakfast, and try eating a piece of fruit for a mid-afternoon snack. · For painful, itchy hemorrhoids, put ice or a cold pack on the area several times a day for 10 minutes at a time. Follow this by putting a warm compress on the area for another 10 to 20 minutes or by sitting in a shallow, warm bath. When should you call for help? Call 911 anytime you think you may need emergency care. For example, call if:  · You are thinking of hurting yourself, your baby, or anyone else. · You passed out (lost consciousness). · You have symptoms of a blood clot in your lung (called a pulmonary embolism). These may include:    · Sudden chest pain. · Trouble breathing. · Coughing up blood. Call your doctor now or seek immediate medical care if:  · You have severe vaginal bleeding. · You are soaking through a pad each hour for 2 or more hours. · Your vaginal bleeding seems to be getting heavier or is still bright red 4 days after delivery. · You are dizzy or lightheaded, or you feel like you may faint. · You are vomiting or cannot keep fluids down. · You have a fever. · You have new or more belly pain. · You pass tissue (not just blood). · Your vaginal discharge smells bad. · Your belly feels tender or full and hard. · Your breasts are continuously painful or red. · You feel sad, anxious, or hopeless for more than a few days. · You have sudden, severe pain in your belly. · You have symptoms of a blood clot in your leg (called a deep vein thrombosis),          such as:  · Pain in your calf, back of the knee, thigh, or groin. · Redness and swelling in your leg or groin.   · You have symptoms of preeclampsia, such as:  · Sudden swelling of your face, hands, or feet. · New vision problems (such as dimness or blurring). · A severe headache. · Your blood pressure is higher than it should be or rises suddenly. · You have new nausea or vomiting. Watch closely for changes in your health, and be sure to contact your doctor if you have any problems. Additional Information:  Not applicable    These are general instructions for a healthy lifestyle:    No smoking/ No tobacco products/ Avoid exposure to second hand smoke    Surgeon General's Warning:  Quitting smoking now greatly reduces serious risk to your health. Obesity, smoking, and sedentary lifestyle greatly increases your risk for illness    A healthy diet, regular physical exercise & weight monitoring are important for maintaining a healthy lifestyle    Recognize signs and symptoms of STROKE:    F-face looks uneven    A-arms unable to move or move unevenly    S-speech slurred or non-existent    T-time-call 911 as soon as signs and symptoms begin - DO NOT go       back to bed or wait to see if you get better - TIME IS BRAIN. I have had the opportunity to make my options or choices for discharge. I have received and understand these instructions.

## 2018-09-28 NOTE — DISCHARGE SUMMARY
Obstetrical Discharge Summary     Name: Lennox Romero MRN: [id number]  SSN: [id number]    YOB: 1990  Age: 33 y.o. Sex: female      Admit Date: 2018    Discharge Date: 2018     Admitting Physician: Pacheco Pride MD     Attending Physician:  João Garcia MD     Admission Diagnoses: Normal labor    Discharge Diagnoses:   Information for the patient's :  Ceil Favorite [[id number]]   Delivery of a 4.365 kg male infant via Vaginal, Spontaneous Delivery on 2018 at 3:04 PM  by . Apgars were 8 and 9. Additional Diagnoses:   Hospital Problems  Never Reviewed          Codes Class Noted POA    Normal labor ICD-10-CM: [de-identified], Z37.9  ICD-9-CM: 850  2017 Unknown             Lab Results   Component Value Date/Time    Rubella, External Immune 2018    GrBStrep, External Negative 2018       Hospital Course: Normal hospital course following the delivery. Disposition at Discharge: Home or self care    Discharged Condition: Stable    Patient Instructions:   Current Discharge Medication List      START taking these medications    Details   ibuprofen (MOTRIN) 600 mg tablet Take 1 Tab by mouth every six (6) hours as needed for Pain. Qty: 30 Tab, Refills: 6         CONTINUE these medications which have NOT CHANGED    Details   prenatal vit-calcium-iron-fa (PRENATAL PLUS, CALCIUM CARB,) 27 mg iron- 1 mg tab Take 1 Tab by mouth daily. Reference my discharge instructions.     Follow-up Appointments   Procedures    FOLLOW UP VISIT Appointment in: 6 Weeks     Standing Status:   Standing     Number of Occurrences:   1     Order Specific Question:   Appointment in     Answer:   6 Weeks        Signed By:  Conchita Dillard MD     2018

## 2018-09-28 NOTE — PROGRESS NOTES
Post-Partum Day Number 2 Progress Note Sedonia Bile Assessment: Doing well, post partum day 2 Plan: 1. Discharge home today, no evidence of recurrence of vaginal hematoma that was seen with G1 delivery. 2. Follow up in office in 6 weeks with Christianne Singh MD 
3. Post partum activity advised, diet as tolerated 4. Discharge Medications: ibuprofen, percocet and medications prior to admission 5. Circumcision performed without issue. Information for the patient's :  Jennifer Ibarra [[id number]] Vaginal, Spontaneous Delivery Patient doing well without significant complaint. Voiding without difficulty, normal lochia. Vitals: 
Visit Vitals  /84  Pulse (!) 106  Temp 98.1 °F (36.7 °C)  Resp 18  Ht 5' 6\" (1.676 m)  Wt 93 kg (205 lb)  SpO2 97%  Breastfeeding Unknown  BMI 33.09 kg/m2 Temp (24hrs), Av.9 °F (36.6 °C), Min:97.4 °F (36.3 °C), Max:98.3 °F (36.8 °C) Exam:    
    Patient without distress. Abdomen soft, fundus firm, nontender Lower extremities are negative for swelling, cords or tenderness. Labs:  
 
Lab Results Component Value Date/Time  WBC 9.1 2018 07:25 AM  
 WBC 14.9 (H) 2017 09:01 AM  
 WBC 6.0 2015 01:45 AM  
 WBC 19.2 (H) 2014 07:00 PM  
 WBC 18.8 (H) 2014 07:04 AM  
 WBC 20.0 (H) 2014 02:03 AM  
 WBC 20.3 (H) 11/10/2014 10:12 PM  
 WBC 23.5 (H) 11/10/2014 08:06 PM  
 WBC 22.0 (H) 11/10/2014 06:08 PM  
 WBC 11.6 (H) 11/10/2014 04:15 AM  
 HGB 12.6 2018 07:25 AM  
 HGB 14.2 2017 09:01 AM  
 HGB 14.0 2015 01:45 AM  
 HGB 9.5 (L) 2014 07:00 PM  
 HGB 7.3 (L) 2014 07:04 AM  
 HGB 8.0 (L) 2014 02:03 AM  
 HGB 8.4 (L) 11/10/2014 10:12 PM  
 HGB 9.3 (L) 11/10/2014 08:06 PM  
 HGB 9.9 (L) 11/10/2014 06:08 PM  
 HGB 13.2 11/10/2014 04:15 AM  
 HCT 38.3 2018 07:25 AM  
 HCT 42.3 2017 09:01 AM  
 HCT 43.0 01/28/2015 01:45 AM  
 HCT 28.5 (L) 11/11/2014 07:00 PM  
 HCT 22.0 (L) 11/11/2014 07:04 AM  
 HCT 23.5 (L) 11/11/2014 02:03 AM  
 HCT 25.3 (L) 11/10/2014 10:12 PM  
 HCT 27.9 (L) 11/10/2014 08:06 PM  
 HCT 29.5 (L) 11/10/2014 06:08 PM  
 HCT 39.4 11/10/2014 04:15 AM  
 PLATELET 90 (L) 51/16/0738 07:25 AM  
 PLATELET 717 (L) 80/19/8531 09:01 AM  
 PLATELET 536 43/98/7641 01:45 AM  
 PLATELET 542 (L) 47/31/4820 07:00 PM  
 PLATELET 447 (L) 56/54/1950 07:04 AM  
 PLATELET 244 (L) 76/84/6611 02:03 AM  
 PLATELET 312 (L) 02/63/8995 10:12 PM  
 PLATELET 443 62/38/0592 08:06 PM  
 PLATELET 441 (L) 09/08/7074 06:08 PM  
 PLATELET 208 (L) 86/69/1616 04:15 AM  
 
 
No results found for this or any previous visit (from the past 24 hour(s)).

## 2018-09-28 NOTE — LACTATION NOTE
This note was copied from a baby's chart. Mom hoping for discharge with baby today. Mom states the baby has been latching and nursing well. She feels her breasts are getting russo and her milk is coming in. She is hearing the baby swallow frequently and occasionally will cough at the breast when milk appears to come too fast.  
 
We talked about ways to help baby deal with milk coming fast when her milk lets down. She can feed in the prone position, let some of the milk squirt from her breasts when it comes down or she can put some pressure on her breast when she feels the let down. Baby has a possible tight frenulum. Mom said her first child had to have her frenulum clipped at 3weeks of age. This baby is able to get his tongue to his gumline but not to his lips. Moms nipples are slightly red and she said it is a little sore when baby nurses. We discussed engorgement. Breasts may become engorged when milk \"comes in\". How milk is made / normal phases of milk production, supply and demand discussed. Taught care of engorged breasts - frequent breastfeeding encouraged. Mom should put the baby to the breast and allow him to completely finish one breast before offering the second breast. She may pump a couple minutes after nursing for comfort. She can apply ice to the breasts for 10-15 minutes after nursing as needed. Pumping and returning to work/school discussed:  Start pumping for storage after first 2-3 weeks- about one hour after first AM feeding when supply is most abundant, once a day to start, timing of pumping at work/school, storage options and guidelines, and clean private pumping location (never in the bathroom). Breast feeding teaching completed and all questions answered. Leslye Assessment for Lingual Frenulum Function Function Appearance Lateralization:  
   2: Complete 1: Body of tongue but not tongue tip 
   0: None 1  
Appearance of tongue when lifted:  
   2: Round or square 1: Slight cleft in tip apparent 
   0: Heart or V-shaped 1  
 
Lift of tongue: 
   2: Tip to mid-mouth 
   1: Only edges to mid-mouth 
   0: Tip stays at lower alveolar ridge or 
       rises to mid-mouth only with jaw   
       closure                                                                                    2  
Elasticity of frenulum: 
    2: Very elastic 
    1: Moderately elastic 
    0: Little or no elasticity 1  
 
Extension of tongue: 
   2: Tip over lower lip 1: Tip over lower gum only 0: Neither of the above, or anterior              or mid-tongue humps 1 
  
Length of lingual frenulum when tongue lifted: 
   2: > 1 cm 
   1: = 1 cm 
   0: < 1 cm 1  
 
Spread of anterior tongue: 
   2: Complete 
   1: Moderate or partial 
  0: Little or none 1  
Attachment of lingual frenulum to tongue: 
   2: Posterior to tip 
   1: At tip 
   0: Notched Tip 2 Cuppin: Entire edge, firm cup 
   1: Side edges only, moderate cup 
   0: Poor or no cup 2  
 
Attachment of lingual frenulum to inferior alveolar ridge: 
   2: Attached to floor of mouth or well           below ridge 1: Attached just below ridge 
   0: Attached at ridge 2  
 
Peristalsis: 
    2: Complete, anterior to posterior 1: Partial, originating posterior to tip 
   0: None or reverse motion 2   
 
Snapback: 
   2: None 1: Periodic 
   0: Frequent or with each suck 1 Score Appearance: 7 
(<8 = ankyloglossia) Function:      10 
(<11 = ankyloglossia) Significant ankyloglossia is diagnosed when the appearance score total is 8 or less and/or function score total was 11 or less. Severe maternal nipple pain during breastfeeding, without alternate explanation, (as assessed by a Lactation Consultant), is also grounds to consider frenotomy, if a tight anterior frenulum is noted. Appearance Criteria: 
 
Appearance of the tongue when lifted is determined by inspecting the anterior edge of the tongue as the infant cries or tries to lift or extend the tongue. Elasticity of the frenulum is determined by palpating the frenulum for elasticity while lifting the infants tongue. Length of the lingual frenulum is determined by noting its approximate 
length in centimeters as the tongue is lifted. Attachment of the frenulum to the tongue is determined by noting its origin on 
the inferior aspect of the tongue. It should be approximately 1 cm posterior to the tip. Attachment of the lingual frenulum to the inferior alveolar ridge is determined by noting the location of the anterior 
attachment of the frenulum. It should insert proximal to or into the genioglossus muscle on the floor of the mouth. Function Criteria: 
 
Lateralization is measured by eliciting the transverse tongue reflex by tracing the lower gum ridge and brushing the lateral edge of the tongue with the examiners finger. Lift of the tongue is noted when the finger is removed from the infants mouth. If the infant cries, then the tongue tip should lift to mid-mouth without jaw closure. Extension of the tongue is measured by eliciting the tongue extrusion reflex bybrushing the lower lip downward toward the chin. Spread of anterior tongue is determined by first eliciting a rooting reflex, just before cupping, by tickling the upper and lower lips and looking for even thinning of the anterior tongue. Cupping is a measure of the degree to which the tongue hugs the finger as the infant sucks on it. Peristalsis is a backward, wave-like motion of the tongue during sucking that should originate at the tip of the tongue and is felt with the back of the examiners finger. Snapback is heard as a clucking sound when the tethered tongue loses it grasp on the finger or breast when the infant tries to generate negative pressure. PARTHA Sauceda., Katie Spaulding (2002). Ankyloglossia: Assessment, Incidence, and 
Effect of Frenuloplasty on the Breastfeeding Dyad.  Pediatrics 2002;110;e63

## 2018-09-28 NOTE — PROGRESS NOTES
Post-Partum Day Number 1 Progress Note Aaron Bender Assessment: Doing well, post partum day 1 Plan: 1. Continue routine postpartum and perineal care as well as maternal education. 2. The risks and benefits of the circumcision  procedure and anesthesia including: bleeding, infection, variability of cosmetic results were discussed at length with the mother. She is aware that future repeat procedures may be necessary. She gives informed consent to proceed as noted and her questions are answered. Was asked to wait on circumcision for feeding issues. Information for the patient's :  Ana Denton [[id number]] Vaginal, Spontaneous Delivery Patient doing well without significant complaint. Voiding without difficulty, normal lochia. Vitals: 
Visit Vitals  /80 (BP 1 Location: Left arm, BP Patient Position: At rest)  Pulse 80  Temp 97.8 °F (36.6 °C)  Resp 16  
 Ht 5' 6\" (1.676 m)  Wt 93 kg (205 lb)  SpO2 97%  Breastfeeding Unknown  BMI 33.09 kg/m2 Temp (24hrs), Av.8 °F (36.6 °C), Min:97.4 °F (36.3 °C), Max:98.1 °F (36.7 °C) Exam:   Patient without distress. Abdomen soft, fundus firm, nontender Perineum with normal lochia noted. Lower extremities are negative for swelling, cords or tenderness. Labs:  
 
Lab Results Component Value Date/Time  WBC 9.1 2018 07:25 AM  
 WBC 14.9 (H) 2017 09:01 AM  
 WBC 6.0 2015 01:45 AM  
 WBC 19.2 (H) 2014 07:00 PM  
 WBC 18.8 (H) 2014 07:04 AM  
 WBC 20.0 (H) 2014 02:03 AM  
 WBC 20.3 (H) 11/10/2014 10:12 PM  
 WBC 23.5 (H) 11/10/2014 08:06 PM  
 WBC 22.0 (H) 11/10/2014 06:08 PM  
 WBC 11.6 (H) 11/10/2014 04:15 AM  
 HGB 12.6 2018 07:25 AM  
 HGB 14.2 2017 09:01 AM  
 HGB 14.0 2015 01:45 AM  
 HGB 9.5 (L) 2014 07:00 PM  
 HGB 7.3 (L) 2014 07:04 AM  
 HGB 8.0 (L) 2014 02:03 AM  
 HGB 8.4 (L) 11/10/2014 10:12 PM  
 HGB 9.3 (L) 11/10/2014 08:06 PM  
 HGB 9.9 (L) 11/10/2014 06:08 PM  
 HGB 13.2 11/10/2014 04:15 AM  
 HCT 38.3 09/26/2018 07:25 AM  
 HCT 42.3 06/14/2017 09:01 AM  
 HCT 43.0 01/28/2015 01:45 AM  
 HCT 28.5 (L) 11/11/2014 07:00 PM  
 HCT 22.0 (L) 11/11/2014 07:04 AM  
 HCT 23.5 (L) 11/11/2014 02:03 AM  
 HCT 25.3 (L) 11/10/2014 10:12 PM  
 HCT 27.9 (L) 11/10/2014 08:06 PM  
 HCT 29.5 (L) 11/10/2014 06:08 PM  
 HCT 39.4 11/10/2014 04:15 AM  
 PLATELET 90 (L) 91/17/5448 07:25 AM  
 PLATELET 988 (L) 58/17/5631 09:01 AM  
 PLATELET 902 16/65/8630 01:45 AM  
 PLATELET 637 (L) 15/66/9562 07:00 PM  
 PLATELET 968 (L) 80/30/5225 07:04 AM  
 PLATELET 601 (L) 50/21/1017 02:03 AM  
 PLATELET 527 (L) 51/22/6121 10:12 PM  
 PLATELET 395 33/47/6504 08:06 PM  
 PLATELET 238 (L) 25/77/8349 06:08 PM  
 PLATELET 229 (L) 28/46/0195 04:15 AM  
 
 
No results found for this or any previous visit (from the past 24 hour(s)).

## 2018-09-28 NOTE — ROUTINE PROCESS
Bedside shift change report given to Diaz Nunez RN (oncoming nurse) by Naseem Conrad. Hector Herzog RN (offgoing nurse). Report included the following information SBAR, Intake/Output, MAR and Recent Results.

## 2019-03-16 ENCOUNTER — HOSPITAL ENCOUNTER (EMERGENCY)
Age: 29
Discharge: HOME | End: 2019-03-16
Attending: EMERGENCY MEDICINE
Payer: COMMERCIAL

## 2019-03-16 VITALS
DIASTOLIC BLOOD PRESSURE: 68 MMHG | OXYGEN SATURATION: 99 % | TEMPERATURE: 98 F | RESPIRATION RATE: 20 BRPM | HEART RATE: 74 BPM | SYSTOLIC BLOOD PRESSURE: 106 MMHG

## 2019-03-16 DIAGNOSIS — R06.02 SOB (SHORTNESS OF BREATH): Primary | ICD-10-CM

## 2019-03-16 DIAGNOSIS — M54.6 ACUTE MIDLINE THORACIC BACK PAIN: ICD-10-CM

## 2019-03-16 LAB
ALBUMIN SERPL-MCNC: 4.2 G/DL (ref 3.5–5)
ALBUMIN/GLOB SERPL: 1.2 {RATIO} (ref 1.1–2.2)
ALP SERPL-CCNC: 97 U/L (ref 45–117)
ALT SERPL-CCNC: 18 U/L (ref 12–78)
ANION GAP SERPL CALC-SCNC: 6 MMOL/L (ref 5–15)
AST SERPL-CCNC: 12 U/L (ref 15–37)
ATRIAL RATE: 65 BPM
BASOPHILS # BLD: 0.1 K/UL (ref 0–0.1)
BASOPHILS NFR BLD: 1 % (ref 0–1)
BILIRUB SERPL-MCNC: 0.4 MG/DL (ref 0.2–1)
BUN SERPL-MCNC: 21 MG/DL (ref 6–20)
BUN/CREAT SERPL: 30 (ref 12–20)
CALCIUM SERPL-MCNC: 9.3 MG/DL (ref 8.5–10.1)
CALCULATED P AXIS, ECG09: 60 DEGREES
CALCULATED R AXIS, ECG10: 42 DEGREES
CALCULATED T AXIS, ECG11: 25 DEGREES
CHLORIDE SERPL-SCNC: 109 MMOL/L (ref 97–108)
CO2 SERPL-SCNC: 25 MMOL/L (ref 21–32)
COMMENT, HOLDF: NORMAL
CREAT SERPL-MCNC: 0.7 MG/DL (ref 0.55–1.02)
D DIMER PPP FEU-MCNC: <0.19 MG/L FEU (ref 0–0.65)
DIAGNOSIS, 93000: NORMAL
DIFFERENTIAL METHOD BLD: NORMAL
EOSINOPHIL # BLD: 0.2 K/UL (ref 0–0.4)
EOSINOPHIL NFR BLD: 3 % (ref 0–7)
ERYTHROCYTE [DISTWIDTH] IN BLOOD BY AUTOMATED COUNT: 12.7 % (ref 11.5–14.5)
GLOBULIN SER CALC-MCNC: 3.4 G/DL (ref 2–4)
GLUCOSE SERPL-MCNC: 85 MG/DL (ref 65–100)
HCT VFR BLD AUTO: 44 % (ref 35–47)
HGB BLD-MCNC: 14 G/DL (ref 11.5–16)
IMM GRANULOCYTES # BLD AUTO: 0 K/UL (ref 0–0.04)
IMM GRANULOCYTES NFR BLD AUTO: 0 % (ref 0–0.5)
LYMPHOCYTES # BLD: 3.3 K/UL (ref 0.8–3.5)
LYMPHOCYTES NFR BLD: 48 % (ref 12–49)
MCH RBC QN AUTO: 29.7 PG (ref 26–34)
MCHC RBC AUTO-ENTMCNC: 31.8 G/DL (ref 30–36.5)
MCV RBC AUTO: 93.4 FL (ref 80–99)
MONOCYTES # BLD: 0.5 K/UL (ref 0–1)
MONOCYTES NFR BLD: 8 % (ref 5–13)
NEUTS SEG # BLD: 2.8 K/UL (ref 1.8–8)
NEUTS SEG NFR BLD: 40 % (ref 32–75)
NRBC # BLD: 0 K/UL (ref 0–0.01)
NRBC BLD-RTO: 0 PER 100 WBC
P-R INTERVAL, ECG05: 128 MS
PLATELET # BLD AUTO: 187 K/UL (ref 150–400)
PMV BLD AUTO: 11.3 FL (ref 8.9–12.9)
POTASSIUM SERPL-SCNC: 3.5 MMOL/L (ref 3.5–5.1)
PROT SERPL-MCNC: 7.6 G/DL (ref 6.4–8.2)
Q-T INTERVAL, ECG07: 412 MS
QRS DURATION, ECG06: 118 MS
QTC CALCULATION (BEZET), ECG08: 428 MS
RBC # BLD AUTO: 4.71 M/UL (ref 3.8–5.2)
SAMPLES BEING HELD,HOLD: NORMAL
SODIUM SERPL-SCNC: 140 MMOL/L (ref 136–145)
TROPONIN I SERPL-MCNC: <0.05 NG/ML
VENTRICULAR RATE, ECG03: 65 BPM
WBC # BLD AUTO: 6.9 K/UL (ref 3.6–11)

## 2019-03-16 PROCEDURE — 93005 ELECTROCARDIOGRAM TRACING: CPT

## 2019-03-16 PROCEDURE — 85379 FIBRIN DEGRADATION QUANT: CPT

## 2019-03-16 PROCEDURE — 74011250636 HC RX REV CODE- 250/636: Performed by: EMERGENCY MEDICINE

## 2019-03-16 PROCEDURE — 80053 COMPREHEN METABOLIC PANEL: CPT

## 2019-03-16 PROCEDURE — 36415 COLL VENOUS BLD VENIPUNCTURE: CPT

## 2019-03-16 PROCEDURE — 84484 ASSAY OF TROPONIN QUANT: CPT

## 2019-03-16 PROCEDURE — 85025 COMPLETE CBC W/AUTO DIFF WBC: CPT

## 2019-03-16 PROCEDURE — 96374 THER/PROPH/DIAG INJ IV PUSH: CPT

## 2019-03-16 PROCEDURE — 99284 EMERGENCY DEPT VISIT MOD MDM: CPT

## 2019-03-16 RX ORDER — KETOROLAC TROMETHAMINE 30 MG/ML
30 INJECTION, SOLUTION INTRAMUSCULAR; INTRAVENOUS
Status: COMPLETED | OUTPATIENT
Start: 2019-03-16 | End: 2019-03-16

## 2019-03-16 RX ORDER — IBUPROFEN 600 MG/1
600 TABLET ORAL
Qty: 20 TAB | Refills: 0 | Status: SHIPPED | OUTPATIENT
Start: 2019-03-16

## 2019-03-16 RX ADMIN — KETOROLAC TROMETHAMINE 30 MG: 30 INJECTION, SOLUTION INTRAMUSCULAR; INTRAVENOUS at 03:09

## 2019-03-16 NOTE — ED TRIAGE NOTES
Patient reports mid upper back pain and shortness of breath that started yesterday. Pain is worse with inspiration. States R calf pain yesterday. Denies leg swelling, recent long trips/travel. Denies chest pain, cough and fevers.

## 2019-03-16 NOTE — ED NOTES
Dr. Yonatan García reviewed discharge instructions with the patient. The patient verbalized understanding. .  Patient ambulated out of the emergency department without assistance. Patient remains in pain, but has relief of most intense pain. Patient is in no apparent distress.

## 2019-03-16 NOTE — DISCHARGE INSTRUCTIONS
Patient Education   - Ibuprofen as needed for pain. - Please follow-up with PCP if not improving by next week. - Return to ED for fever, increased pain, shortness of breath, any other concerns. Back Pain: Care Instructions  Your Care Instructions    Back pain has many possible causes. It is often related to problems with muscles and ligaments of the back. It may also be related to problems with the nerves, discs, or bones of the back. Moving, lifting, standing, sitting, or sleeping in an awkward way can strain the back. Sometimes you don't notice the injury until later. Arthritis is another common cause of back pain. Although it may hurt a lot, back pain usually improves on its own within several weeks. Most people recover in 12 weeks or less. Using good home treatment and being careful not to stress your back can help you feel better sooner. Follow-up care is a key part of your treatment and safety. Be sure to make and go to all appointments, and call your doctor if you are having problems. It's also a good idea to know your test results and keep a list of the medicines you take. How can you care for yourself at home? · Sit or lie in positions that are most comfortable and reduce your pain. Try one of these positions when you lie down:  ? Lie on your back with your knees bent and supported by large pillows. ? Lie on the floor with your legs on the seat of a sofa or chair. ? Lie on your side with your knees and hips bent and a pillow between your legs. ? Lie on your stomach if it does not make pain worse. · Do not sit up in bed, and avoid soft couches and twisted positions. Bed rest can help relieve pain at first, but it delays healing. Avoid bed rest after the first day of back pain. · Change positions every 30 minutes. If you must sit for long periods of time, take breaks from sitting. Get up and walk around, or lie in a comfortable position.   · Try using a heating pad on a low or medium setting for 15 to 20 minutes every 2 or 3 hours. Try a warm shower in place of one session with the heating pad. · You can also try an ice pack for 10 to 15 minutes every 2 to 3 hours. Put a thin cloth between the ice pack and your skin. · Take pain medicines exactly as directed. ? If the doctor gave you a prescription medicine for pain, take it as prescribed. ? If you are not taking a prescription pain medicine, ask your doctor if you can take an over-the-counter medicine. · Take short walks several times a day. You can start with 5 to 10 minutes, 3 or 4 times a day, and work up to longer walks. Walk on level surfaces and avoid hills and stairs until your back is better. · Return to work and other activities as soon as you can. Continued rest without activity is usually not good for your back. · To prevent future back pain, do exercises to stretch and strengthen your back and stomach. Learn how to use good posture, safe lifting techniques, and proper body mechanics. When should you call for help? Call your doctor now or seek immediate medical care if:    · You have new or worsening numbness in your legs.     · You have new or worsening weakness in your legs. (This could make it hard to stand up.)     · You lose control of your bladder or bowels.    Watch closely for changes in your health, and be sure to contact your doctor if:    · You have a fever, lose weight, or don't feel well.     · You do not get better as expected. Where can you learn more? Go to http://agusto-heather.info/. Enter P917 in the search box to learn more about \"Back Pain: Care Instructions. \"  Current as of: September 20, 2018  Content Version: 11.9  © 7060-5606 [employer]. Care instructions adapted under license by [employer] (which disclaims liability or warranty for this information).  If you have questions about a medical condition or this instruction, always ask your healthcare professional. [employer]), [facility] disclaims any warranty or liability for your use of this information. Patient Education        Shortness of Breath: Care Instructions  Your Care Instructions  Shortness of breath has many causes. Sometimes conditions such as anxiety can lead to shortness of breath. Some people get mild shortness of breath when they exercise. Trouble breathing also can be a symptom of a serious problem, such as asthma, lung disease, emphysema, heart problems, and pneumonia. If your shortness of breath continues, you may need tests and treatment. Watch for any changes in your breathing and other symptoms. Follow-up care is a key part of your treatment and safety. Be sure to make and go to all appointments, and call your doctor if you are having problems. It's also a good idea to know your test results and keep a list of the medicines you take. How can you care for yourself at home? · Do not smoke or allow others to smoke around you. If you need help quitting, talk to your doctor about stop-smoking programs and medicines. These can increase your chances of quitting for good. · Get plenty of rest and sleep. · Take your medicines exactly as prescribed. Call your doctor if you think you are having a problem with your medicine. · Find healthy ways to deal with stress. ? Exercise daily. ? Get plenty of sleep. ? Eat regularly and well. When should you call for help? Call 911 anytime you think you may need emergency care. For example, call if:    · You have severe shortness of breath.     · You have symptoms of a heart attack. These may include:  ? Chest pain or pressure, or a strange feeling in the chest.  ? Sweating. ? Shortness of breath. ? Nausea or vomiting. ? Pain, pressure, or a strange feeling in the back, neck, jaw, or upper belly or in one or both shoulders or arms. ? Lightheadedness or sudden weakness. ? A fast or irregular heartbeat.   After you call 911, the  may tell you to chew 1 adult-strength or 2 to 4 low-dose aspirin. Wait for an ambulance. Do not try to drive yourself.    Call your doctor now or seek immediate medical care if:    · Your shortness of breath gets worse or you start to wheeze. Wheezing is a high-pitched sound when you breathe.     · You wake up at night out of breath or have to prop your head up on several pillows to breathe.     · You are short of breath after only light activity or while at rest.    Watch closely for changes in your health, and be sure to contact your doctor if:    · You do not get better over the next 1 to 2 days. Where can you learn more? Go to http://agusto-heather.info/. Enter S780 in the search box to learn more about \"Shortness of Breath: Care Instructions. \"  Current as of: September 5, 2018  Content Version: 11.9  © 4819-2171 [employer]. Care instructions adapted under license by [employer] (which disclaims liability or warranty for this information). If you have questions about a medical condition or this instruction, always ask your healthcare professional. [place] [address] any warranty or liability for your use of this information. Patient Education        Pleurisy: Care Instructions  Your Care Instructions  Pleurisy is inflammation of the tissue that lines the inside of the chest and covers the lungs (pleura). Pleurisy is often caused by an infection, usually a virus. It also can be caused by other health problems, such as pneumonia or lupus. Pleurisy can cause sharp chest pain that gets worse when you cough or take a deep breath. You may need more tests to find out what is causing your pleurisy. Treatment depends on the cause. Pleurisy may come and go for a few days, or it may continue if the cause has not been treated. Home treatment can help ease symptoms. Follow-up care is a key part of your treatment and safety.  Be sure to make and go to all appointments, and call your doctor if you are having problems. It's also a good idea to know your test results and keep a list of the medicines you take. How can you care for yourself at home? · Take an over-the-counter pain medicine, such as acetaminophen (Tylenol), ibuprofen (Advil, Motrin), or naproxen (Aleve). Read and follow all instructions on the label. · Do not take two or more pain medicines at the same time unless the doctor told you to. Many pain medicines have acetaminophen, which is Tylenol. Too much acetaminophen (Tylenol) can be harmful. · If your doctor prescribed antibiotics, take them as directed. Do not stop taking them just because you feel better. You need to take the full course of antibiotics. · Take cough medicine as directed if your doctor recommends it. · Avoid activities that make the pain worse. When should you call for help? Call 911 anytime you think you may need emergency care. For example, call if:    · You have severe trouble breathing.     · You have severe chest pain.     · You passed out (lost consciousness).    Call your doctor now or seek immediate medical care if:    · You have a new or higher fever.    Watch closely for changes in your health, and be sure to contact your doctor if:    · You begin to cough up yellow or green mucus.     · You cough up blood.     · Your symptoms are not better in 3 or 4 days. Where can you learn more? Go to http://agusto-heather.info/. Enter F346 in the search box to learn more about \"Pleurisy: Care Instructions. \"  Current as of: September 5, 2018  Content Version: 11.9  © 3874-8475 [employer]. Care instructions adapted under license by [employer] (which disclaims liability or warranty for this information). If you have questions about a medical condition or this instruction, always ask your healthcare professional. [address] any warranty or liability for your use of this information.

## 2019-03-16 NOTE — ED PROVIDER NOTES
33 y.o. female with no significant past medical history who presents from home with chief complaint of back pain. Pt reports awaking this morning with pain to her back. The pain resides between her shoulder blades. The pain has been constant, 7/10 in severity, mildly exacerbated with movement and pleuritic. Pt denies hx of similar symptoms. She notes an episode of right calf pain but states that has improved. While in ED, pt also c/o nausea. No vomiting. Pt further denies chest pain or shortness of breath, weakness, numbness. No xh of DVT or PE. Pt recently had a baby (5 months ago). She has an IUD. Pt denies chance of pregnancy. There are no other acute medical concerns at this time. PCP: Ramírez Serna MD    Note written by Gregory Pereyra, as dictated by Jacob Mcmillan MD 2:52 AM                  History reviewed. No pertinent past medical history.     Past Surgical History:   Procedure Laterality Date    HX OTHER SURGICAL      tonsillectomy    HX OTHER SURGICAL      wisdom teeth removed    HX TONSILLECTOMY      age 8     HX WISDOM TEETH EXTRACTION  2009         Family History:   Problem Relation Age of Onset    Cancer Paternal Grandmother     Hypertension Paternal Grandmother        Social History     Socioeconomic History    Marital status:      Spouse name: Not on file    Number of children: Not on file    Years of education: Not on file    Highest education level: Not on file   Social Needs    Financial resource strain: Not on file    Food insecurity - worry: Not on file    Food insecurity - inability: Not on file   [employer] needs - medical: Not on file   [employer] needs - non-medical: Not on file   Occupational History    Not on file   Tobacco Use    Smoking status: Never Smoker    Smokeless tobacco: Never Used   Substance and Sexual Activity    Alcohol use: No    Drug use: No    Sexual activity: Yes     Partners: Male     Birth control/protection: None   Other Topics Concern    Not on file   Social History Narrative    Not on file         ALLERGIES: Sulfa (sulfonamide antibiotics)    Review of Systems   Constitutional: Negative for appetite change and fever. HENT: Negative for congestion, nosebleeds and sore throat. Eyes: Negative for discharge. Respiratory: Negative for cough and shortness of breath. Cardiovascular: Negative for chest pain. Gastrointestinal: Positive for nausea. Negative for abdominal pain, diarrhea and vomiting. Genitourinary: Negative for dysuria. Musculoskeletal: Positive for back pain and myalgias. Skin: Negative for rash. Neurological: Negative for weakness and headaches. Hematological: Negative for adenopathy. Psychiatric/Behavioral: Negative. All other systems reviewed and are negative. There were no vitals filed for this visit. Physical Exam   Constitutional: She is oriented to person, place, and time. She appears well-developed and well-nourished. HENT:   Head: Normocephalic and atraumatic. Mouth/Throat: Oropharynx is clear and moist.   Eyes: Conjunctivae are normal.   Neck: Normal range of motion. Neck supple. Cardiovascular: Normal rate, regular rhythm and normal heart sounds. Pulmonary/Chest: Effort normal and breath sounds normal.   Abdominal: Soft. Bowel sounds are normal. There is no tenderness. Musculoskeletal: Normal range of motion. She exhibits no edema or tenderness. Neurological: She is alert and oriented to person, place, and time. Skin: Skin is warm and dry. Psychiatric: She has a normal mood and affect. Her behavior is normal.   Nursing note and vitals reviewed. Note written by Gregory Bar, as dictated by Sandra Mathews MD 2:55 AM       MDM       Procedures    ED EKG interpretation:  Rhythm: normal sinus rhythm; and regular .  Rate (approx.): 65; Axis: normal; P wave: normal; QRS interval: normal ; ST/T wave: normal; Interpreted by Guy Woody Amy Hammond MD    A/P:  1. Back pain - ? MSK vs pleurisy. Motrin prn.  2. SOB - lungs clear, normal O2 sats. Patient's results have been reviewed with them. Patient and/or family have verbally conveyed their understanding and agreement of the patient's signs, symptoms, diagnosis, treatment and prognosis and additionally agree to follow up as recommended or return to the Emergency Room should their condition change prior to follow-up. Discharge instructions have also been provided to the patient with some educational information regarding their diagnosis as well a list of reasons why they would want to return to the ER prior to their follow-up appointment should their condition change.

## 2019-03-18 ENCOUNTER — PATIENT OUTREACH (OUTPATIENT)
Dept: OTHER | Age: 29
End: 2019-03-18

## 2019-03-18 NOTE — PROGRESS NOTES
HPRP progress note    Patient eligible for Gisela Johnson 994 care management    Received notification of discharge from [facility] ED on 3/16/19 for SOB. Contacted patient to discuss post discharge needs and offer care management services. Two patient identifiers verified. Discussed the care management program.  Patient agrees to care management services at this time. PMH: No past medical history on file. Social History:   Social History     Socioeconomic History    Marital status:      Spouse name: Not on file    Number of children: Not on file    Years of education: Not on file    Highest education level: Not on file   Social Needs    Financial resource strain: Not on file    Food insecurity - worry: Not on file    Food insecurity - inability: Not on file   [employer] needs - medical: Not on file   [employer] needs - non-medical: Not on file   Occupational History    Not on file   Tobacco Use    Smoking status: Never Smoker    Smokeless tobacco: Never Used   Substance and Sexual Activity    Alcohol use: No    Drug use: No    Sexual activity: Yes     Partners: Male     Birth control/protection: None   Other Topics Concern    Not on file   Social History Narrative    Not on file         Care management assessment completed:    *Spoke with patient who reports that she is better.      -Denies any SOB at this time.    -Rates pain as 1 on 0-10 pain scale.    -Is not taking any medication for pain. Condition Focused Assessment:   Respiratory Condition Focused Assessment  Supplemental oxygen use? no   Clean and working equipment? N/A   Oxygen settings- N/A  Cough no    Patient reported important numbers to know:3/16/19  Oxygen level 98%   Temperature 97.9 °F   Description of any sputum Non - productive   Pain 7/10   Weight 205 Lbs     Any change in activity level?  yes  Any of the following?    Shortness of breath or difficulty breathing yes   Dizziness/lightheadedness no Fever no   Low body temperature no   Chills or shaking no   Sweating no    Dyspnea on exertion yes     Fatigue yes     Anxiety yes    Confusionno   Unexplained change in weight loss no   Sleep disturbance no     Incentive Spirometer? no   Does patient have action plan? yes       Red Flags:  Call 911 anytime you think you may need emergency care. For example, call if:   You have chest pain or pressure. This may occur with:  Sweating. ? Shortness of breath. ?Nausea or vomiting. ?Pain that spreads from the chest to the neck, jaw, or one or both shoulders or arms. ?Dizziness or lightheadedness. ?A fast or uneven pulse. After calling 911, chew 1 adult-strength aspirin. Wait for an ambulance. Do not try to drive yourself. You have sudden chest pain and shortness of breath, or you cough up blood. You have severe trouble breathing. You have severe chest pain. You passed out (lost consciousness). Call your doctor now or seek immediate medical care if:  Your shortness of breath gets worse or you start to wheeze. Wheezing is a high-pitched sound when you breathe. You wake up at night out of breath or have to prop your head up on several pillows to breathe. You are short of breath after only light activity or while at rest.  You begin to cough up yellow or green mucus. You cough up blood. You do not get better over the next 1 to 2 days. Medications:  New Medications at Discharge:   ibuprofen 600 mg tablet        Changed Medications at Discharge: None Noted    Discontinued Medications at Discharge: None Noted  Current Outpatient Medications   Medication Sig    ibuprofen (MOTRIN) 600 mg tablet Take 1 Tab by mouth every six (6) hours as needed for Pain.  prenatal vit-calcium-iron-fa (PRENATAL PLUS, CALCIUM CARB,) 27 mg iron- 1 mg tab Take 1 Tab by mouth daily. No current facility-administered medications for this visit. There are no discontinued medications.     Performed medication reconciliation with patient, and patient verbalizes understanding of administration of home medications. There were no barriers to obtaining medications identified at this time. Preventative Care     Health Maintenance   Topic Date Due    PAP AKA CERVICAL CYTOLOGY  04/02/2012    DTaP/Tdap/Td series (3 - Td) 07/09/2028    Influenza Age 9 to Adult  Completed       CM Identified  Problems  (Contributing problems for risk for readmission)  1. Pain  2. Ineffective Airway Clearance      Goals   Patients pain will be controlled at 5/10 or less with pain medication and/or nonpharmacologic methods. Patient will be knowledgeable regarding symptoms to report to MD.      Barriers/Support system:  patient and spouse      Barriers/Challenges to Care: []  Decline in memory    []  Language barrier     []  Emotional                  []  Limited mobility  []  Lack of motivation     [] Vision, hearing or cognitive impairment [x]  Knowledge [] Financial Barriers []  Lack of support  [x]  Pain []  Other []  None    PCP/Specialist follow up: Patient is not scheduled to follow up with Jumana Krishna MD -offered to make appointment- patient declined. No future appointments. Reviewed red flags with patient, and patient verbalizes understanding. Patient given an opportunity to ask questions. No other clinical/social/functional needs noted. The patient agrees to contact the PCP office for questions related to their healthcare. The patient expressed thanks, offered no additional questions and ended the call.       Plan for next call: 4/3/19 f/u SOB

## 2019-04-03 ENCOUNTER — PATIENT OUTREACH (OUTPATIENT)
Dept: OTHER | Age: 29
End: 2019-04-03

## 2019-04-24 ENCOUNTER — PATIENT OUTREACH (OUTPATIENT)
Dept: OTHER | Age: 29
End: 2019-04-24

## 2019-04-24 NOTE — PROGRESS NOTES
Final call made today. Contacted patient for transitions of care services. Verified  and address for HIPAA security. 
 
  
*Spoke with patient who reports that she is better. 
  
                        -Denies any further episodes of SOB. -Rates pain as 0 on 0-10 pain scale. 
                        -Has not any medication for pain. *Offered to make f/u appointment with PCP - patient declined. Patient had no other questions or concerns. Contact information provided and reminded her that I can be reached if any needs arise in the future. Resolving current episode 3/16/19(Transitions of care complete). No further ED/UC or hospital admissions within 30 days post discharge. No outreach from patient to [address] [place] [address].

## 2022-03-20 PROBLEM — Z37.9 NORMAL LABOR: Status: ACTIVE | Noted: 2017-06-14

## 2022-03-24 ENCOUNTER — HOSPITAL ENCOUNTER (OUTPATIENT)
Dept: PERINATAL CARE | Age: 32
Discharge: HOME | End: 2022-03-24
Attending: OBSTETRICS & GYNECOLOGY
Payer: COMMERCIAL

## 2022-03-24 PROCEDURE — 76805 OB US >/= 14 WKS SNGL FETUS: CPT | Performed by: OBSTETRICS & GYNECOLOGY
